# Patient Record
Sex: FEMALE | Race: WHITE | NOT HISPANIC OR LATINO | URBAN - METROPOLITAN AREA
[De-identification: names, ages, dates, MRNs, and addresses within clinical notes are randomized per-mention and may not be internally consistent; named-entity substitution may affect disease eponyms.]

---

## 2017-01-31 ENCOUNTER — OUTPATIENT (OUTPATIENT)
Dept: OUTPATIENT SERVICES | Facility: HOSPITAL | Age: 67
LOS: 1 days | Discharge: HOME | End: 2017-01-31

## 2017-01-31 DIAGNOSIS — C50.919 MALIGNANT NEOPLASM OF UNSPECIFIED SITE OF UNSPECIFIED FEMALE BREAST: ICD-10-CM

## 2017-02-02 ENCOUNTER — APPOINTMENT (OUTPATIENT)
Dept: HEMATOLOGY ONCOLOGY | Facility: CLINIC | Age: 67
End: 2017-02-02

## 2017-02-02 VITALS
BODY MASS INDEX: 24.21 KG/M2 | SYSTOLIC BLOOD PRESSURE: 120 MMHG | TEMPERATURE: 98.1 F | WEIGHT: 145.48 LBS | RESPIRATION RATE: 16 BRPM | HEART RATE: 74 BPM | DIASTOLIC BLOOD PRESSURE: 80 MMHG | OXYGEN SATURATION: 99 %

## 2017-03-17 ENCOUNTER — FORM ENCOUNTER (OUTPATIENT)
Age: 67
End: 2017-03-17

## 2017-03-18 ENCOUNTER — APPOINTMENT (OUTPATIENT)
Dept: MRI IMAGING | Facility: IMAGING CENTER | Age: 67
End: 2017-03-18

## 2017-03-18 ENCOUNTER — OUTPATIENT (OUTPATIENT)
Dept: OUTPATIENT SERVICES | Facility: HOSPITAL | Age: 67
LOS: 1 days | End: 2017-03-18
Payer: COMMERCIAL

## 2017-03-18 DIAGNOSIS — Z00.8 ENCOUNTER FOR OTHER GENERAL EXAMINATION: ICD-10-CM

## 2017-03-18 PROCEDURE — C8908: CPT

## 2017-03-18 PROCEDURE — C8937: CPT

## 2017-03-18 PROCEDURE — 82565 ASSAY OF CREATININE: CPT

## 2017-03-18 PROCEDURE — A9585: CPT

## 2017-04-21 ENCOUNTER — APPOINTMENT (OUTPATIENT)
Dept: GYNECOLOGIC ONCOLOGY | Facility: CLINIC | Age: 67
End: 2017-04-21

## 2017-04-21 VITALS
HEART RATE: 71 BPM | HEIGHT: 65 IN | BODY MASS INDEX: 24.16 KG/M2 | DIASTOLIC BLOOD PRESSURE: 78 MMHG | WEIGHT: 145 LBS | SYSTOLIC BLOOD PRESSURE: 122 MMHG

## 2017-05-01 ENCOUNTER — RX RENEWAL (OUTPATIENT)
Age: 67
End: 2017-05-01

## 2017-05-12 ENCOUNTER — OUTPATIENT (OUTPATIENT)
Dept: OUTPATIENT SERVICES | Facility: HOSPITAL | Age: 67
LOS: 1 days | End: 2017-05-12
Payer: COMMERCIAL

## 2017-05-12 ENCOUNTER — APPOINTMENT (OUTPATIENT)
Dept: ULTRASOUND IMAGING | Facility: IMAGING CENTER | Age: 67
End: 2017-05-12

## 2017-05-12 DIAGNOSIS — Z15.01 GENETIC SUSCEPTIBILITY TO MALIGNANT NEOPLASM OF BREAST: ICD-10-CM

## 2017-05-12 DIAGNOSIS — Z15.02 GENETIC SUSCEPTIBILITY TO MALIGNANT NEOPLASM OF OVARY: ICD-10-CM

## 2017-05-12 PROCEDURE — 76830 TRANSVAGINAL US NON-OB: CPT

## 2017-05-12 PROCEDURE — 76856 US EXAM PELVIC COMPLETE: CPT

## 2017-07-31 ENCOUNTER — OUTPATIENT (OUTPATIENT)
Dept: OUTPATIENT SERVICES | Facility: HOSPITAL | Age: 67
LOS: 1 days | Discharge: HOME | End: 2017-07-31

## 2017-07-31 DIAGNOSIS — C50.919 MALIGNANT NEOPLASM OF UNSPECIFIED SITE OF UNSPECIFIED FEMALE BREAST: ICD-10-CM

## 2017-08-03 ENCOUNTER — RESULT REVIEW (OUTPATIENT)
Age: 67
End: 2017-08-03

## 2017-08-03 ENCOUNTER — APPOINTMENT (OUTPATIENT)
Dept: HEMATOLOGY ONCOLOGY | Facility: CLINIC | Age: 67
End: 2017-08-03
Payer: COMMERCIAL

## 2017-08-03 VITALS
SYSTOLIC BLOOD PRESSURE: 119 MMHG | TEMPERATURE: 98 F | RESPIRATION RATE: 16 BRPM | BODY MASS INDEX: 24.17 KG/M2 | HEART RATE: 70 BPM | DIASTOLIC BLOOD PRESSURE: 77 MMHG | HEIGHT: 65 IN | OXYGEN SATURATION: 98 % | WEIGHT: 145.06 LBS

## 2017-08-03 DIAGNOSIS — Z14.8 GENETIC CARRIER OF OTHER DISEASE: ICD-10-CM

## 2017-08-03 DIAGNOSIS — Z15.02 GENETIC SUSCEPTIBILITY TO MALIGNANT NEOPLASM OF OVARY: ICD-10-CM

## 2017-08-03 DIAGNOSIS — R92.8 OTHER ABNORMAL AND INCONCLUSIVE FINDINGS ON DIAGNOSTIC IMAGING OF BREAST: ICD-10-CM

## 2017-08-03 DIAGNOSIS — F32.9 MAJOR DEPRESSIVE DISORDER, SINGLE EPISODE, UNSPECIFIED: ICD-10-CM

## 2017-08-03 LAB
HCT VFR BLD CALC: 42.6 % — SIGNIFICANT CHANGE UP (ref 34.5–45)
HGB BLD-MCNC: 14.3 G/DL — SIGNIFICANT CHANGE UP (ref 11.5–15.5)
MCHC RBC-ENTMCNC: 32.3 PG — SIGNIFICANT CHANGE UP (ref 27–34)
MCHC RBC-ENTMCNC: 33.7 G/DL — SIGNIFICANT CHANGE UP (ref 32–36)
MCV RBC AUTO: 95.9 FL — SIGNIFICANT CHANGE UP (ref 80–100)
PLATELET # BLD AUTO: 196 K/UL — SIGNIFICANT CHANGE UP (ref 150–400)
RBC # BLD: 4.44 M/UL — SIGNIFICANT CHANGE UP (ref 3.8–5.2)
RBC # FLD: 13.3 % — SIGNIFICANT CHANGE UP (ref 10.3–14.5)
WBC # BLD: 4.2 K/UL — SIGNIFICANT CHANGE UP (ref 3.8–10.5)
WBC # FLD AUTO: 4.2 K/UL — SIGNIFICANT CHANGE UP (ref 3.8–10.5)

## 2017-08-03 PROCEDURE — 99215 OFFICE O/P EST HI 40 MIN: CPT

## 2017-08-03 RX ORDER — PSYLLIUM HUSK 0.4 G
CAPSULE ORAL
Refills: 0 | Status: ACTIVE | COMMUNITY

## 2017-08-04 LAB
25(OH)D3 SERPL-MCNC: 47.2 NG/ML
ALBUMIN SERPL ELPH-MCNC: 4.6 G/DL
ALP BLD-CCNC: 59 U/L
ALT SERPL-CCNC: 14 U/L
ANION GAP SERPL CALC-SCNC: 15 MMOL/L
AST SERPL-CCNC: 18 U/L
BILIRUB SERPL-MCNC: 0.5 MG/DL
BUN SERPL-MCNC: 14 MG/DL
CALCIUM SERPL-MCNC: 9.8 MG/DL
CHLORIDE SERPL-SCNC: 106 MMOL/L
CO2 SERPL-SCNC: 21 MMOL/L
CREAT SERPL-MCNC: 0.97 MG/DL
GLUCOSE SERPL-MCNC: 98 MG/DL
POTASSIUM SERPL-SCNC: 5 MMOL/L
PROT SERPL-MCNC: 7 G/DL
SODIUM SERPL-SCNC: 142 MMOL/L

## 2017-09-12 ENCOUNTER — APPOINTMENT (OUTPATIENT)
Dept: ENDOCRINOLOGY | Facility: CLINIC | Age: 67
End: 2017-09-12
Payer: COMMERCIAL

## 2017-09-12 VITALS
HEIGHT: 64.5 IN | OXYGEN SATURATION: 98 % | DIASTOLIC BLOOD PRESSURE: 80 MMHG | HEART RATE: 68 BPM | WEIGHT: 144 LBS | SYSTOLIC BLOOD PRESSURE: 114 MMHG | BODY MASS INDEX: 24.29 KG/M2

## 2017-09-12 DIAGNOSIS — M85.88 OTHER SPECIFIED DISORDERS OF BONE DENSITY AND STRUCTURE, OTHER SITE: ICD-10-CM

## 2017-09-12 PROCEDURE — 99213 OFFICE O/P EST LOW 20 MIN: CPT | Mod: 25

## 2017-09-12 PROCEDURE — 77080 DXA BONE DENSITY AXIAL: CPT

## 2017-09-13 PROBLEM — M85.88 OSTEOPENIA OF SPINE: Status: ACTIVE | Noted: 2017-09-13

## 2017-09-20 LAB
COLLAGEN NTX UR-SCNC: 24 NM BCE/MM CR
CREAT UR-MCNC: 61 MG/DL

## 2018-02-02 ENCOUNTER — OUTPATIENT (OUTPATIENT)
Dept: OUTPATIENT SERVICES | Facility: HOSPITAL | Age: 68
LOS: 1 days | Discharge: HOME | End: 2018-02-02

## 2018-02-02 DIAGNOSIS — C50.919 MALIGNANT NEOPLASM OF UNSPECIFIED SITE OF UNSPECIFIED FEMALE BREAST: ICD-10-CM

## 2018-02-08 ENCOUNTER — APPOINTMENT (OUTPATIENT)
Dept: HEMATOLOGY ONCOLOGY | Facility: CLINIC | Age: 68
End: 2018-02-08
Payer: COMMERCIAL

## 2018-02-08 ENCOUNTER — RESULT REVIEW (OUTPATIENT)
Age: 68
End: 2018-02-08

## 2018-02-08 VITALS
HEART RATE: 71 BPM | WEIGHT: 145.48 LBS | DIASTOLIC BLOOD PRESSURE: 63 MMHG | BODY MASS INDEX: 24.59 KG/M2 | TEMPERATURE: 99 F | RESPIRATION RATE: 16 BRPM | SYSTOLIC BLOOD PRESSURE: 124 MMHG | OXYGEN SATURATION: 97 %

## 2018-02-08 LAB
HCT VFR BLD CALC: 40.9 % — SIGNIFICANT CHANGE UP (ref 34.5–45)
HGB BLD-MCNC: 13.4 G/DL — SIGNIFICANT CHANGE UP (ref 11.5–15.5)
MCHC RBC-ENTMCNC: 31.3 PG — SIGNIFICANT CHANGE UP (ref 27–34)
MCHC RBC-ENTMCNC: 32.9 G/DL — SIGNIFICANT CHANGE UP (ref 32–36)
MCV RBC AUTO: 95.3 FL — SIGNIFICANT CHANGE UP (ref 80–100)
PLATELET # BLD AUTO: 205 K/UL — SIGNIFICANT CHANGE UP (ref 150–400)
RBC # BLD: 4.29 M/UL — SIGNIFICANT CHANGE UP (ref 3.8–5.2)
RBC # FLD: 11.9 % — SIGNIFICANT CHANGE UP (ref 10.3–14.5)
WBC # BLD: 5.6 K/UL — SIGNIFICANT CHANGE UP (ref 3.8–10.5)
WBC # FLD AUTO: 5.6 K/UL — SIGNIFICANT CHANGE UP (ref 3.8–10.5)

## 2018-02-08 PROCEDURE — 99214 OFFICE O/P EST MOD 30 MIN: CPT

## 2018-02-09 LAB
ALBUMIN SERPL ELPH-MCNC: 4.2 G/DL
ALP BLD-CCNC: 68 U/L
ALT SERPL-CCNC: 24 U/L
ANION GAP SERPL CALC-SCNC: 12 MMOL/L
AST SERPL-CCNC: 24 U/L
BILIRUB SERPL-MCNC: 0.3 MG/DL
BUN SERPL-MCNC: 16 MG/DL
CALCIUM SERPL-MCNC: 8.8 MG/DL
CHLORIDE SERPL-SCNC: 105 MMOL/L
CO2 SERPL-SCNC: 26 MMOL/L
CREAT SERPL-MCNC: 0.83 MG/DL
GLUCOSE SERPL-MCNC: 94 MG/DL
POTASSIUM SERPL-SCNC: 4.4 MMOL/L
PROT SERPL-MCNC: 6.6 G/DL
SODIUM SERPL-SCNC: 143 MMOL/L

## 2018-02-18 ENCOUNTER — RX RENEWAL (OUTPATIENT)
Age: 68
End: 2018-02-18

## 2018-03-16 ENCOUNTER — FORM ENCOUNTER (OUTPATIENT)
Age: 68
End: 2018-03-16

## 2018-03-17 ENCOUNTER — APPOINTMENT (OUTPATIENT)
Dept: MRI IMAGING | Facility: IMAGING CENTER | Age: 68
End: 2018-03-17
Payer: COMMERCIAL

## 2018-03-17 ENCOUNTER — OUTPATIENT (OUTPATIENT)
Dept: OUTPATIENT SERVICES | Facility: HOSPITAL | Age: 68
LOS: 1 days | End: 2018-03-17
Payer: COMMERCIAL

## 2018-03-17 DIAGNOSIS — R92.8 OTHER ABNORMAL AND INCONCLUSIVE FINDINGS ON DIAGNOSTIC IMAGING OF BREAST: ICD-10-CM

## 2018-03-17 DIAGNOSIS — C50.911 MALIGNANT NEOPLASM OF UNSPECIFIED SITE OF RIGHT FEMALE BREAST: ICD-10-CM

## 2018-03-17 DIAGNOSIS — Z15.09 GENETIC SUSCEPTIBILITY TO OTHER MALIGNANT NEOPLASM: ICD-10-CM

## 2018-03-17 PROCEDURE — A9585: CPT

## 2018-03-17 PROCEDURE — 0159T: CPT | Mod: 26

## 2018-03-17 PROCEDURE — C8908: CPT

## 2018-03-17 PROCEDURE — 77059 MRI BREAST BILATERAL: CPT | Mod: 26

## 2018-03-17 PROCEDURE — C8937: CPT

## 2018-04-27 ENCOUNTER — APPOINTMENT (OUTPATIENT)
Dept: GYNECOLOGIC ONCOLOGY | Facility: CLINIC | Age: 68
End: 2018-04-27
Payer: COMMERCIAL

## 2018-04-27 VITALS
DIASTOLIC BLOOD PRESSURE: 77 MMHG | BODY MASS INDEX: 24.79 KG/M2 | SYSTOLIC BLOOD PRESSURE: 127 MMHG | HEIGHT: 64.5 IN | WEIGHT: 147 LBS | HEART RATE: 70 BPM

## 2018-04-27 PROCEDURE — 99213 OFFICE O/P EST LOW 20 MIN: CPT

## 2018-04-28 LAB — HPV HIGH+LOW RISK DNA PNL CVX: NOT DETECTED

## 2018-05-03 LAB — CYTOLOGY CVX/VAG DOC THIN PREP: NORMAL

## 2018-09-06 ENCOUNTER — APPOINTMENT (OUTPATIENT)
Dept: ENDOCRINOLOGY | Facility: CLINIC | Age: 68
End: 2018-09-06
Payer: COMMERCIAL

## 2018-09-06 ENCOUNTER — OUTPATIENT (OUTPATIENT)
Dept: OUTPATIENT SERVICES | Facility: HOSPITAL | Age: 68
LOS: 1 days | Discharge: HOME | End: 2018-09-06

## 2018-09-06 VITALS
HEART RATE: 77 BPM | SYSTOLIC BLOOD PRESSURE: 138 MMHG | DIASTOLIC BLOOD PRESSURE: 72 MMHG | BODY MASS INDEX: 24.62 KG/M2 | WEIGHT: 146 LBS | OXYGEN SATURATION: 98 % | HEIGHT: 64.5 IN

## 2018-09-06 DIAGNOSIS — C50.919 MALIGNANT NEOPLASM OF UNSPECIFIED SITE OF UNSPECIFIED FEMALE BREAST: ICD-10-CM

## 2018-09-06 PROCEDURE — 99213 OFFICE O/P EST LOW 20 MIN: CPT | Mod: 25

## 2018-09-06 PROCEDURE — 77080 DXA BONE DENSITY AXIAL: CPT

## 2018-09-13 ENCOUNTER — APPOINTMENT (OUTPATIENT)
Dept: HEMATOLOGY ONCOLOGY | Facility: CLINIC | Age: 68
End: 2018-09-13
Payer: COMMERCIAL

## 2018-09-13 ENCOUNTER — RESULT REVIEW (OUTPATIENT)
Age: 68
End: 2018-09-13

## 2018-09-13 VITALS
HEART RATE: 65 BPM | DIASTOLIC BLOOD PRESSURE: 79 MMHG | OXYGEN SATURATION: 99 % | SYSTOLIC BLOOD PRESSURE: 135 MMHG | WEIGHT: 147.05 LBS | TEMPERATURE: 98.2 F | BODY MASS INDEX: 24.85 KG/M2 | RESPIRATION RATE: 16 BRPM

## 2018-09-13 LAB
HCT VFR BLD CALC: 43.5 % — SIGNIFICANT CHANGE UP (ref 34.5–45)
HGB BLD-MCNC: 14.3 G/DL — SIGNIFICANT CHANGE UP (ref 11.5–15.5)
MCHC RBC-ENTMCNC: 31.3 PG — SIGNIFICANT CHANGE UP (ref 27–34)
MCHC RBC-ENTMCNC: 32.9 G/DL — SIGNIFICANT CHANGE UP (ref 32–36)
MCV RBC AUTO: 95.1 FL — SIGNIFICANT CHANGE UP (ref 80–100)
PLATELET # BLD AUTO: 216 K/UL — SIGNIFICANT CHANGE UP (ref 150–400)
RBC # BLD: 4.57 M/UL — SIGNIFICANT CHANGE UP (ref 3.8–5.2)
RBC # FLD: 12.1 % — SIGNIFICANT CHANGE UP (ref 10.3–14.5)
WBC # BLD: 6 K/UL — SIGNIFICANT CHANGE UP (ref 3.8–10.5)
WBC # FLD AUTO: 6 K/UL — SIGNIFICANT CHANGE UP (ref 3.8–10.5)

## 2018-09-13 PROCEDURE — 99214 OFFICE O/P EST MOD 30 MIN: CPT

## 2018-09-14 ENCOUNTER — RX RENEWAL (OUTPATIENT)
Age: 68
End: 2018-09-14

## 2018-09-20 LAB
25(OH)D3 SERPL-MCNC: 42.6 NG/ML
ALBUMIN SERPL ELPH-MCNC: 4.5 G/DL
ALP BLD-CCNC: 82 U/L
ALT SERPL-CCNC: 21 U/L
ANION GAP SERPL CALC-SCNC: 15 MMOL/L
AST SERPL-CCNC: 25 U/L
BILIRUB SERPL-MCNC: 0.2 MG/DL
BUN SERPL-MCNC: 15 MG/DL
CALCIUM SERPL-MCNC: 9.4 MG/DL
CHLORIDE SERPL-SCNC: 102 MMOL/L
CO2 SERPL-SCNC: 25 MMOL/L
CREAT SERPL-MCNC: 0.83 MG/DL
GLUCOSE SERPL-MCNC: 102 MG/DL
POTASSIUM SERPL-SCNC: 4 MMOL/L
PROT SERPL-MCNC: 6.9 G/DL
SODIUM SERPL-SCNC: 142 MMOL/L

## 2018-11-16 ENCOUNTER — RX RENEWAL (OUTPATIENT)
Age: 68
End: 2018-11-16

## 2018-11-23 ENCOUNTER — TRANSCRIPTION ENCOUNTER (OUTPATIENT)
Age: 68
End: 2018-11-23

## 2019-02-13 ENCOUNTER — RX RENEWAL (OUTPATIENT)
Age: 69
End: 2019-02-13

## 2019-03-07 ENCOUNTER — OUTPATIENT (OUTPATIENT)
Dept: OUTPATIENT SERVICES | Facility: HOSPITAL | Age: 69
LOS: 1 days | Discharge: HOME | End: 2019-03-07

## 2019-03-07 DIAGNOSIS — C50.919 MALIGNANT NEOPLASM OF UNSPECIFIED SITE OF UNSPECIFIED FEMALE BREAST: ICD-10-CM

## 2019-03-14 ENCOUNTER — RESULT REVIEW (OUTPATIENT)
Age: 69
End: 2019-03-14

## 2019-03-14 ENCOUNTER — APPOINTMENT (OUTPATIENT)
Dept: HEMATOLOGY ONCOLOGY | Facility: CLINIC | Age: 69
End: 2019-03-14
Payer: COMMERCIAL

## 2019-03-14 VITALS
OXYGEN SATURATION: 98 % | SYSTOLIC BLOOD PRESSURE: 129 MMHG | RESPIRATION RATE: 16 BRPM | BODY MASS INDEX: 24.7 KG/M2 | HEART RATE: 65 BPM | WEIGHT: 146.16 LBS | TEMPERATURE: 98.4 F | DIASTOLIC BLOOD PRESSURE: 78 MMHG

## 2019-03-14 LAB
HCT VFR BLD CALC: 42 % — SIGNIFICANT CHANGE UP (ref 34.5–45)
HGB BLD-MCNC: 14.3 G/DL — SIGNIFICANT CHANGE UP (ref 11.5–15.5)
MCHC RBC-ENTMCNC: 32 PG — SIGNIFICANT CHANGE UP (ref 27–34)
MCHC RBC-ENTMCNC: 34 G/DL — SIGNIFICANT CHANGE UP (ref 32–36)
MCV RBC AUTO: 94.1 FL — SIGNIFICANT CHANGE UP (ref 80–100)
PLATELET # BLD AUTO: 204 K/UL — SIGNIFICANT CHANGE UP (ref 150–400)
RBC # BLD: 4.46 M/UL — SIGNIFICANT CHANGE UP (ref 3.8–5.2)
RBC # FLD: 12.1 % — SIGNIFICANT CHANGE UP (ref 10.3–14.5)
WBC # BLD: 5.6 K/UL — SIGNIFICANT CHANGE UP (ref 3.8–10.5)
WBC # FLD AUTO: 5.6 K/UL — SIGNIFICANT CHANGE UP (ref 3.8–10.5)

## 2019-03-14 PROCEDURE — 99214 OFFICE O/P EST MOD 30 MIN: CPT

## 2019-03-14 NOTE — HISTORY OF PRESENT ILLNESS
[Disease: _____________________] : Disease: [unfilled] [T: ___] : T[unfilled] [N: ___] : N[unfilled] [M: ___] : M[unfilled] [AJCC Stage: ____] : AJCC Stage: [unfilled] [Treatment Protocol] : Treatment Protocol [ENT] : ENT [Musculoskeletal] : Musculoskeletal [de-identified] : The patient presented in 2009, at age 58, with the mass she discovered on BSE. \par  \par Ultimately Dr. Ignacia Sidhu performed a right mastectomy and right axillary lymph node dissection followed by placement of a saline expander by Dr. Jozef Saini. There were  2 foci of infiltrating ductal carcinoma 4.5 cm apart from each other measuring 1.4 and 0.7 cm respectively. Both tumors were ER positive, MD positive,  and HER-2/syd negative. 1/8 lymph nodes was positive for metastatic disease. \par  \par Postoperatively the patient received 4 cycles of TC chemotherapy extending from June 22, 2009 through August 24, 2009. In September 2009 she started anastrozole which she continues to take. \par  \par In 2011 the patient underwent genetic testing and was found to have a deleterious mutation of BRCA1 (5385 ins C). \par  \par On October 7, 2011 the patient underwent risk reduction BSO and left simple mastectomy / sentinel node biopsy. Pathology from all  specimens was benign. \par  \par  [de-identified] : Multifocal well differentiated  IDC ER+IA+Vmi0axt- [FreeTextEntry1] : Arimidex start 09/22/2009 [de-identified] : The patient has been 9  years 11  months since breast cancer diagnosis.\par \par The patient completed chemotherapy 9 years 11 months  ago.\par \par The patient has been on anastrozole for 9 years, 6 months no side effects reported.\par \par Last saw  breast surgeon Dr Sidhu 02/2019 for her q yearly CBE, exam stable.Next f/u 02/2020\par \par MRI reconstructed breasts 3/17/2018 stable, BI RADS 2, 1 year follow up recommended.\par \par The patient has not seen her plastic surgeon since her last  5 visits  here.\par \par The patient states she had bilateral nipple tattoos done 06/2018 with "a " who is international known and resides in [place].\par \par Last saw PCP Dr Rodriguez 11/2018 for now resolved Rhinitis. \par \par Last saw endocrinologist Dr Acharya 9/06/2018, had bone density which demonstrated a decrease in all site, the patient states she was advised to re initiate  ibandronate. Next f/u 09/2019.\par \par The  patient states she completed once weekly PT and "cold laser therapy" for occasional pain  in right foot since last 2 visit. \par \par Overall the patient states she feels well since last visit.\par \par No other interval event since last seen.\par \par

## 2019-03-14 NOTE — PHYSICAL EXAM
[Fully active, able to carry on all pre-disease performance without restriction] : Status 0 - Fully active, able to carry on all pre-disease performance without restriction [Normal] : affect appropriate [de-identified] : Bilateral mastectomies with silicone implants, no chest wall mass, no axillary adenopathy [de-identified] : Vitiligo

## 2019-03-14 NOTE — REVIEW OF SYSTEMS
[Anxiety] : anxiety [Negative] : Allergic/Immunologic [FreeTextEntry2] : Energy level WNL, still  running, doing yoga, lifting weight and biking. S/P flu vaccination 09/2017. [FreeTextEntry3] : Last eye exam was 06/2018,with benign findings.. [FreeTextEntry4] : See interval history. [FreeTextEntry7] : Last colonoscopy  02/15/2016, no polyps, revealed diverticulosis.Patient state she was advised to return in 5 years. Appetite is good.Now on weight watches diet. [FreeTextEntry8] : Last GYN exam 04/27/2018, with DR Mariah Harris, benign exam. Scheduled for f/u end of this month. Denies abnormal vaginal/spotting/bleeding. [FreeTextEntry9] : Most recent  BMD 09/08/2018, see interval history. Occasional back pain in am which later goes away after warming up, still feels it's from aging. [de-identified] : Had whole body skin check 06/2018, exam with benign findings. [de-identified] : Insomnia and anxiety controlled with lorazepam.

## 2019-04-01 LAB
ALBUMIN SERPL ELPH-MCNC: 4.4 G/DL
ALP BLD-CCNC: 64 U/L
ALT SERPL-CCNC: 20 U/L
ANION GAP SERPL CALC-SCNC: 12 MMOL/L
AST SERPL-CCNC: 22 U/L
BILIRUB SERPL-MCNC: 0.2 MG/DL
BUN SERPL-MCNC: 16 MG/DL
CALCIUM SERPL-MCNC: 9.5 MG/DL
CHLORIDE SERPL-SCNC: 104 MMOL/L
CO2 SERPL-SCNC: 26 MMOL/L
CREAT SERPL-MCNC: 0.85 MG/DL
GLUCOSE SERPL-MCNC: 93 MG/DL
POTASSIUM SERPL-SCNC: 4.1 MMOL/L
PROT SERPL-MCNC: 7 G/DL
SODIUM SERPL-SCNC: 142 MMOL/L

## 2019-04-30 ENCOUNTER — APPOINTMENT (OUTPATIENT)
Dept: GYNECOLOGIC ONCOLOGY | Facility: CLINIC | Age: 69
End: 2019-04-30
Payer: COMMERCIAL

## 2019-04-30 VITALS
HEIGHT: 64.5 IN | SYSTOLIC BLOOD PRESSURE: 119 MMHG | BODY MASS INDEX: 23.95 KG/M2 | HEART RATE: 66 BPM | WEIGHT: 142 LBS | DIASTOLIC BLOOD PRESSURE: 73 MMHG

## 2019-04-30 PROCEDURE — XXXXX: CPT

## 2019-08-16 ENCOUNTER — APPOINTMENT (OUTPATIENT)
Dept: MRI IMAGING | Facility: IMAGING CENTER | Age: 69
End: 2019-08-16

## 2019-09-23 ENCOUNTER — OUTPATIENT (OUTPATIENT)
Dept: OUTPATIENT SERVICES | Facility: HOSPITAL | Age: 69
LOS: 1 days | Discharge: HOME | End: 2019-09-23

## 2019-09-23 DIAGNOSIS — C50.919 MALIGNANT NEOPLASM OF UNSPECIFIED SITE OF UNSPECIFIED FEMALE BREAST: ICD-10-CM

## 2019-09-24 ENCOUNTER — RESULT REVIEW (OUTPATIENT)
Age: 69
End: 2019-09-24

## 2019-09-24 ENCOUNTER — APPOINTMENT (OUTPATIENT)
Dept: HEMATOLOGY ONCOLOGY | Facility: CLINIC | Age: 69
End: 2019-09-24
Payer: COMMERCIAL

## 2019-09-24 VITALS
BODY MASS INDEX: 23.73 KG/M2 | RESPIRATION RATE: 17 BRPM | HEART RATE: 68 BPM | SYSTOLIC BLOOD PRESSURE: 132 MMHG | WEIGHT: 140.43 LBS | DIASTOLIC BLOOD PRESSURE: 82 MMHG | OXYGEN SATURATION: 98 % | TEMPERATURE: 98.9 F

## 2019-09-24 DIAGNOSIS — E78.00 PURE HYPERCHOLESTEROLEMIA, UNSPECIFIED: ICD-10-CM

## 2019-09-24 LAB
HCT VFR BLD CALC: 46 % — HIGH (ref 34.5–45)
HGB BLD-MCNC: 15.1 G/DL — SIGNIFICANT CHANGE UP (ref 11.5–15.5)
MCHC RBC-ENTMCNC: 32.5 PG — SIGNIFICANT CHANGE UP (ref 27–34)
MCHC RBC-ENTMCNC: 32.7 G/DL — SIGNIFICANT CHANGE UP (ref 32–36)
MCV RBC AUTO: 99.3 FL — SIGNIFICANT CHANGE UP (ref 80–100)
PLATELET # BLD AUTO: 203 K/UL — SIGNIFICANT CHANGE UP (ref 150–400)
RBC # BLD: 4.64 M/UL — SIGNIFICANT CHANGE UP (ref 3.8–5.2)
RBC # FLD: 13.4 % — SIGNIFICANT CHANGE UP (ref 10.3–14.5)
WBC # BLD: 6 K/UL — SIGNIFICANT CHANGE UP (ref 3.8–10.5)
WBC # FLD AUTO: 6 K/UL — SIGNIFICANT CHANGE UP (ref 3.8–10.5)

## 2019-09-24 PROCEDURE — 99215 OFFICE O/P EST HI 40 MIN: CPT

## 2019-09-24 NOTE — ASSESSMENT
[With Patient/Caregiver] : : With Patient/Caregiver [FreeTextEntry2] : We discussed the need for a health care proxy and advance directives on 9/24/2019. We discussed possible appropriate individuals to serve as her proxy.  She was given a health care proxy form to review and she will discuss further with her family.\par Will continue the conversation next visit.\par \par  [AdvancecareDate] : 09/24/2019

## 2019-09-24 NOTE — PHYSICAL EXAM
[de-identified] : Bilateral mastectomies with silicone implants, no chest wall mass, no axillary adenopathy [de-identified] : Vitiligo

## 2019-09-24 NOTE — HISTORY OF PRESENT ILLNESS
[de-identified] : The patient presented in 2009, at age 58, with the mass she discovered on BSE. \par  \par Ultimately Dr. Ignacia Sidhu performed a right mastectomy and right axillary lymph node dissection followed by placement of a saline expander by Dr. Jozef Saini. There were  2 foci of infiltrating ductal carcinoma 4.5 cm apart from each other measuring 1.4 and 0.7 cm respectively. Both tumors were ER positive, MT positive,  and HER-2/syd negative. 1/8 lymph nodes was positive for metastatic disease. \par  \par Postoperatively the patient received 4 cycles of TC chemotherapy extending from June 22, 2009 through August 24, 2009. In September 2009 she started anastrozole which she continues to take. \par  \par In 2011 the patient underwent genetic testing and was found to have a deleterious mutation of BRCA1 (5385 ins C). \par  \par On October 7, 2011 the patient underwent risk reduction BSO and left simple mastectomy / sentinel node biopsy. Pathology from all  specimens was benign. \par  \par  [FreeTextEntry1] : Arimidex start 09/22/2009 - 9/24/2019 [de-identified] : The patient has been 10   years 5  months since breast cancer diagnosis.\par \par The patient completed chemotherapy 10  years 5  months  ago.\par \par The patient has been on anastrozole for 10 years.\par \par Last saw  breast surgeon Dr Sidhu 02/2019 for her q yearly CBE, exam stable.Next f/u 02/2020\par \par MRI reconstructed breasts 3/17/2018 stable, BI RADS 2, 1 year follow up recommended.\par \par Saw Dr Lemus 4/2019, exam stable.\par \par Last saw endocrinologist Dr Acharya 9/06/2018, had bone density which demonstrated a decrease in all site, the patient states she was advised to re initiate  ibandronate. Next f/u 09/2019.\par \par Overall the patient states she feels well since last visit.\par \par No other interval event since last seen.\par \par  [de-identified] : Multifocal well differentiated  IDC ER+RI+Thh2lfl-

## 2019-09-25 LAB
ALBUMIN SERPL ELPH-MCNC: 4.8 G/DL
ALP BLD-CCNC: 65 U/L
ALT SERPL-CCNC: 28 U/L
ANION GAP SERPL CALC-SCNC: 11 MMOL/L
AST SERPL-CCNC: 27 U/L
BILIRUB SERPL-MCNC: 0.2 MG/DL
BUN SERPL-MCNC: 15 MG/DL
CALCIUM SERPL-MCNC: 9.6 MG/DL
CHLORIDE SERPL-SCNC: 104 MMOL/L
CO2 SERPL-SCNC: 27 MMOL/L
CREAT SERPL-MCNC: 0.85 MG/DL
GLUCOSE SERPL-MCNC: 101 MG/DL
POTASSIUM SERPL-SCNC: 4 MMOL/L
PROT SERPL-MCNC: 7.3 G/DL
SODIUM SERPL-SCNC: 142 MMOL/L

## 2019-11-04 ENCOUNTER — APPOINTMENT (OUTPATIENT)
Dept: ENDOCRINOLOGY | Facility: CLINIC | Age: 69
End: 2019-11-04
Payer: COMMERCIAL

## 2019-11-04 VITALS
SYSTOLIC BLOOD PRESSURE: 126 MMHG | DIASTOLIC BLOOD PRESSURE: 70 MMHG | BODY MASS INDEX: 23.78 KG/M2 | HEART RATE: 67 BPM | OXYGEN SATURATION: 99 % | WEIGHT: 141 LBS | HEIGHT: 64.5 IN

## 2019-11-04 DIAGNOSIS — Z79.811 LONG TERM (CURRENT) USE OF AROMATASE INHIBITORS: ICD-10-CM

## 2019-11-04 PROCEDURE — 99213 OFFICE O/P EST LOW 20 MIN: CPT | Mod: 25

## 2019-11-04 PROCEDURE — 77080 DXA BONE DENSITY AXIAL: CPT | Mod: GA

## 2019-11-04 NOTE — PHYSICAL EXAM
[Alert] : alert [No Acute Distress] : no acute distress [Well Nourished] : well nourished [Well Developed] : well developed [Normal Sclera/Conjunctiva] : normal sclera/conjunctiva [No Proptosis] : no proptosis [No LAD] : no lymphadenopathy [Thyroid Not Enlarged] : the thyroid was not enlarged [No Respiratory Distress] : no respiratory distress [Clear to Auscultation] : lungs were clear to auscultation bilaterally [Normal S1, S2] : normal S1 and S2 [Regular Rhythm] : with a regular rhythm [No Edema] : there was no peripheral edema [Normal Bowel Sounds] : normal bowel sounds [Not Tender] : non-tender [Soft] : abdomen soft [No Spinal Tenderness] : no spinal tenderness [Normal Strength/Tone] : muscle strength and tone were normal [Normal Reflexes] : deep tendon reflexes were 2+ and symmetric [No Tremors] : no tremors [Normal Affect] : the affect was normal [Normal Mood] : the mood was normal [Scoliosis] : scoliosis not present

## 2019-11-04 NOTE — HISTORY OF PRESENT ILLNESS
[FreeTextEntry1] : cc: osteopenia\par \par 69 year old woman with osteopenia, on aromatase inhibitor, treated with bisphosphanate tx x ~ 5 years after a rapid drop in bone density, was off tx for one year with decrease in bone density at all sites.  Resumed Boniva ~ 1 year ago.  She reports no recent falls or fractures.  Has been running again and doing yoga, walking.  Consumes 1 serving dairy daily (yogurt) and takes probiotic.  She also takes supplemental calcium with vitamin D.  She was told that she can stop the anastrozole, but she is a little worried about stopping.  Will be continuing for 3 more months\par \par Started weight watchers and has lost weight.\par \par

## 2019-11-04 NOTE — ASSESSMENT
[Bisphosphonate Therapy] : Risks  and benefits of bisphosphonate therapy were  discussed with the patient including gastroesophageal irritation, osteonecrosis of the jaw, and atypical femur fractures, and acute phase reaction [FreeTextEntry1] : 68 year old woman on anastrozole for breast cancer, with osteopenia treated with bisphosphonate tx x 5 years with improved bone density, with decrease after one year "drug holiday" now back on tx for ~one year.  Will be stopping anastrozole in 3 months\par   - DXA performed today, pt with slight increase in bone density.  Recommend continuing the Boniva while on Anastrozole.  Stop Boniva when stopping anastrozole\par  - Repeat DXA one year after stopping (December 2020)\par  - continue calcium 500 mg daily and weight bearing exercise\par \par \par f/u 1 year

## 2019-12-05 ENCOUNTER — APPOINTMENT (OUTPATIENT)
Dept: ULTRASOUND IMAGING | Facility: IMAGING CENTER | Age: 69
End: 2019-12-05
Payer: COMMERCIAL

## 2019-12-05 ENCOUNTER — OUTPATIENT (OUTPATIENT)
Dept: OUTPATIENT SERVICES | Facility: HOSPITAL | Age: 69
LOS: 1 days | End: 2019-12-05
Payer: COMMERCIAL

## 2019-12-05 DIAGNOSIS — Z00.8 ENCOUNTER FOR OTHER GENERAL EXAMINATION: ICD-10-CM

## 2019-12-05 PROCEDURE — 76641 ULTRASOUND BREAST COMPLETE: CPT

## 2019-12-05 PROCEDURE — 76641 ULTRASOUND BREAST COMPLETE: CPT | Mod: 26,50

## 2020-05-07 ENCOUNTER — OUTPATIENT (OUTPATIENT)
Dept: OUTPATIENT SERVICES | Facility: HOSPITAL | Age: 70
LOS: 1 days | Discharge: HOME | End: 2020-05-07

## 2020-05-07 DIAGNOSIS — C50.919 MALIGNANT NEOPLASM OF UNSPECIFIED SITE OF UNSPECIFIED FEMALE BREAST: ICD-10-CM

## 2020-05-12 ENCOUNTER — APPOINTMENT (OUTPATIENT)
Dept: HEMATOLOGY ONCOLOGY | Facility: CLINIC | Age: 70
End: 2020-05-12
Payer: COMMERCIAL

## 2020-05-12 DIAGNOSIS — Z98.82 BREAST IMPLANT STATUS: ICD-10-CM

## 2020-05-12 PROCEDURE — 99213 OFFICE O/P EST LOW 20 MIN: CPT | Mod: 95

## 2020-05-12 NOTE — PHYSICAL EXAM
[Fully active, able to carry on all pre-disease performance without restriction] : Status 0 - Fully active, able to carry on all pre-disease performance without restriction [Normal] : affect appropriate [de-identified] : Weight 138 lbs.

## 2020-05-12 NOTE — ASSESSMENT
[With Patient/Caregiver] : : With Patient/Caregiver [AdvancecareDate] : 09/24/2019 [FreeTextEntry2] : We discussed the need for a health care proxy and advance directives on 9/24/2019. We discussed possible appropriate individuals to serve as her proxy.  She was given a health care proxy form to review and she will discuss further with her family.\par Will continue the conversation next visit.\par \par

## 2020-05-12 NOTE — HISTORY OF PRESENT ILLNESS
[Disease: _____________________] : Disease: [unfilled] [T: ___] : T[unfilled] [N: ___] : N[unfilled] [AJCC Stage: ____] : AJCC Stage: [unfilled] [M: ___] : M[unfilled] [Treatment Protocol] : Treatment Protocol [Home] : at home, [unfilled] , at the time of the visit. [Medical Office: ([facility])___] : at the medical office located in  [Patient] : the patient [Self] : self [FreeTextEntry2] : Di Miller [de-identified] : Multifocal well differentiated  IDC ER+NH+Spr4owi- [FreeTextEntry1] : Arimidex start 09/22/2009 - 1/2020 [de-identified] : The patient presented in 2009, at age 58, with the mass she discovered on BSE. \par  \par Ultimately Dr. Ignacia Sidhu performed a right mastectomy and right axillary lymph node dissection followed by placement of a saline expander by Dr. Jozef Saini. There were  2 foci of infiltrating ductal carcinoma 4.5 cm apart from each other measuring 1.4 and 0.7 cm respectively. Both tumors were ER positive, DC positive,  and HER-2/syd negative. 1/8 lymph nodes was positive for metastatic disease. \par  \par Postoperatively the patient received 4 cycles of TC chemotherapy extending from June 22, 2009 through August 24, 2009. In September 2009 she started anastrozole which she continues to take. \par  \par In 2011 the patient underwent genetic testing and was found to have a deleterious mutation of BRCA1 (5385 ins C). \par  \par On October 7, 2011 the patient underwent risk reduction BSO and left simple mastectomy / sentinel node biopsy. Pathology from all  specimens was benign. \par  \par  [de-identified] : Because of Covid 19 pandemic we agreed to doing a virtual visit  today.\par \par Verbal consent given on 5/12/2020 at 10:50 AM   by Di Miller, patient.\par \par The patient has been 11  years 1  month since breast cancer diagnosis.\par \par The patient completed chemotherapy 10 years 9 months  ago.\par \par The patient completed anastrozole 1/2020 after 19 years 4 months of therapy. Does not feel different off anastrozole, except for increased anxiety. Still feels achy.\par \par Last saw  breast surgeon Dr Sidhu 02/2019 for her q yearly CBE,  plans to schedule follow up this year after COVID 19 pandemic abates.\par \par Patient contacted plastic surgeon, her implants were not recalled.\par \par MRI reconstructed breasts 3/17/2018 stable, BI RADS 2.\par \par US reconstructed breasts 12/5/2019 negative, BIRADS 1\par \par Saw Dr Lemus 4/30/2019, exam stable. Follow up scheduled for 8/7/2020.\par \par Last saw endocrinologist Dr Acharya 11/4/2019. Was told to DC ibandronate when she completed anastrozole. Patient DC'd ibandronate in 1/2020.\par \par Saw PCP Dr Rodriguez earlier this year. Labs done at that time, patient will have reports faxed here.\par \par No other interval event since last seen.\par \par

## 2020-05-12 NOTE — REVIEW OF SYSTEMS
[Anxiety] : anxiety [Negative] : Allergic/Immunologic [FreeTextEntry2] : Energy level WNL, still  running every other day, doing yoga, lifting weight and biking 10 miles every other day.. S/P flu vaccination fall 2019. [FreeTextEntry3] : eyes are tired while using computer. [FreeTextEntry7] : Last colonoscopy  02/15/2016, no polyps, revealed diverticulosis.Patient state she was advised to return in 5 years. [FreeTextEntry8] : Last GYN exam 04/30/2019. See interval history [de-identified] : Had whole body skin check 7/2019, exam with benign findings. [de-identified] : Insomnia and anxiety controlled with lorazepam. [FreeTextEntry9] : Most recent  BMD 11/4/2019. See interval history. Occasional back pain in AM when she wakes up.

## 2020-06-03 ENCOUNTER — APPOINTMENT (OUTPATIENT)
Dept: ORTHOPEDIC SURGERY | Facility: CLINIC | Age: 70
End: 2020-06-03
Payer: COMMERCIAL

## 2020-06-03 VITALS
TEMPERATURE: 98.5 F | WEIGHT: 137 LBS | SYSTOLIC BLOOD PRESSURE: 125 MMHG | HEART RATE: 71 BPM | DIASTOLIC BLOOD PRESSURE: 78 MMHG | HEIGHT: 64.5 IN | BODY MASS INDEX: 23.1 KG/M2

## 2020-06-03 DIAGNOSIS — M54.5 LOW BACK PAIN: ICD-10-CM

## 2020-06-03 PROCEDURE — 72170 X-RAY EXAM OF PELVIS: CPT

## 2020-06-03 PROCEDURE — 72100 X-RAY EXAM L-S SPINE 2/3 VWS: CPT

## 2020-06-03 PROCEDURE — 99204 OFFICE O/P NEW MOD 45 MIN: CPT

## 2020-06-03 NOTE — DISCUSSION/SUMMARY
[de-identified] : Patient was shown her x-rays and gone over the situation detail she was advised to complaints are most likely referable to her spine condition and she will be referred to physical therapy accordingly.  The patient will continue activity level to tolerance she may take meloxicam as needed and will get back to us in 3 to 4 weeks if she is still symptomatic further work-up may be considered.

## 2020-06-03 NOTE — PHYSICAL EXAM
[de-identified] : Physical examination discloses mild muscle spasm of the lower flexion lumbar spine negative straight leg raise.  No acute neurovascular deficits to the lower extremity.  Tenderness to the lateral thigh.  Full and stable nontender range of motion to the hips. [de-identified] : Rays taken of the pelvis and hips are nonspecific.  X-rays of the lumbosacral spine disclose multilevel degenerative disc space narrowing with arthritic scoliosis

## 2020-06-03 NOTE — HISTORY OF PRESENT ILLNESS
[Worsening] : worsening [___ wks] : [unfilled] week(s) ago [2] : a minimum pain level of 2/10 [7] : a maximum pain level of 7/10 [Hip Movement] : worsened by hip movement [Intermit.] : ~He/She~ states the symptoms seem to be intermittent [Walking] : worsened by walking [de-identified] : ascending stairs, sit to stand.  [de-identified] : Pt presents for initial evaluation for pain in her right hip. Pt  states there is no known injury.  Pt states she is a runner and is feeling more uncomfortable. There is no numbness or tingling to the lower extremities. Pt rides a stationary bike apporx  10 miles daily. Pt takes Advil for pain and is helpful.ascending stairs. PT is seeing an endocrinologist and oncologist, (hx breast Ca bilateral mastectomy, brca gene)  and was taking Ibandronate sodium tab, Anastrozole stopped taking in 2/2020

## 2020-07-02 ENCOUNTER — APPOINTMENT (OUTPATIENT)
Dept: ENDOCRINOLOGY | Facility: CLINIC | Age: 70
End: 2020-07-02

## 2020-08-17 ENCOUNTER — APPOINTMENT (OUTPATIENT)
Dept: ENDOCRINOLOGY | Facility: CLINIC | Age: 70
End: 2020-08-17
Payer: COMMERCIAL

## 2020-08-17 VITALS — OXYGEN SATURATION: 98 % | DIASTOLIC BLOOD PRESSURE: 70 MMHG | HEART RATE: 65 BPM | SYSTOLIC BLOOD PRESSURE: 110 MMHG

## 2020-08-17 VITALS — BODY MASS INDEX: 23.44 KG/M2 | HEIGHT: 64.6 IN | TEMPERATURE: 97.1 F | WEIGHT: 139 LBS

## 2020-08-17 DIAGNOSIS — M47.27 OTHER SPONDYLOSIS WITH RADICULOPATHY, LUMBOSACRAL REGION: ICD-10-CM

## 2020-08-17 PROCEDURE — 99213 OFFICE O/P EST LOW 20 MIN: CPT | Mod: 25

## 2020-08-17 PROCEDURE — 77080 DXA BONE DENSITY AXIAL: CPT

## 2020-08-17 PROCEDURE — ZZZZZ: CPT

## 2020-08-17 NOTE — HISTORY OF PRESENT ILLNESS
[FreeTextEntry1] : cc: osteopenia\par \par 70 year old woman with osteopenia, previously on aromatase inhibitor, treated with bisphosphanate tx x ~ 5 years after a rapid drop in bone density, was off tx for one year with decrease in bone density at all sites.  Resumed Boniva for about a year, then stopped aromatase inhibitor tx and boniva was discontinued.  Stopped both in February 2020 .  She reports no recent falls or fractures.  Has been running and doing on-line yoga as well as weights.  Consumes 1 serving dairy daily (yogurt) and takes probiotic.  She also takes supplemental calcium with vitamin D.  \par \par Saw orthopedist for hip pain, was told she has arthritis in her back, and she should keep running.   Had physical therapy, with improvement in pain.\par \par Weight has been stable\par \par

## 2020-08-17 NOTE — ASSESSMENT
[FreeTextEntry1] : 70 year old woman previously on anastrozole for breast cancer, with osteopenia treated with bisphosphonate tx x 5 years with improved bone density, with decrease after one year "drug holiday" then back on tx for ~one year.  Stopped anastrozole and bisphosphonate 6 months ago.\par   - DXA performed today, pt with increased bone density in hip, spine.  Continue off tx\par  - Repeat DXA  in 2 years\par  - continue calcium 500 mg daily and weight bearing exercise\par \par Osteoarthritis - pain improved with physical therapy\par \par f/u 1 year

## 2020-08-17 NOTE — PHYSICAL EXAM
[Healthy Appearance] : healthy appearance [Alert] : alert [Normal Sclera/Conjunctiva] : normal sclera/conjunctiva [No Acute Distress] : no acute distress [No LAD] : no lymphadenopathy [No Proptosis] : no proptosis [Clear to Auscultation] : lungs were clear to auscultation bilaterally [No Respiratory Distress] : no respiratory distress [Thyroid Not Enlarged] : the thyroid was not enlarged [Normal S1, S2] : normal S1 and S2 [Regular Rhythm] : with a regular rhythm [No Edema] : no peripheral edema [Normal Bowel Sounds] : normal bowel sounds [Not Tender] : non-tender [Soft] : abdomen soft [No Spinal Tenderness] : no spinal tenderness [Kyphosis] : no kyphosis present [Normal Reflexes] : deep tendon reflexes were 2+ and symmetric [Normal Strength/Tone] : muscle strength and tone were normal [Normal Affect] : the affect was normal [Normal Mood] : the mood was normal [No Tremors] : no tremors

## 2020-09-15 ENCOUNTER — TRANSCRIPTION ENCOUNTER (OUTPATIENT)
Age: 70
End: 2020-09-15

## 2020-10-03 ENCOUNTER — OUTPATIENT (OUTPATIENT)
Dept: OUTPATIENT SERVICES | Facility: HOSPITAL | Age: 70
LOS: 1 days | End: 2020-10-03
Payer: COMMERCIAL

## 2020-10-03 ENCOUNTER — APPOINTMENT (OUTPATIENT)
Dept: MRI IMAGING | Facility: IMAGING CENTER | Age: 70
End: 2020-10-03
Payer: COMMERCIAL

## 2020-10-03 DIAGNOSIS — Z00.8 ENCOUNTER FOR OTHER GENERAL EXAMINATION: ICD-10-CM

## 2020-10-03 PROCEDURE — A9585: CPT

## 2020-10-03 PROCEDURE — 77049 MRI BREAST C-+ W/CAD BI: CPT | Mod: 26

## 2020-10-03 PROCEDURE — C8908: CPT

## 2020-10-03 PROCEDURE — C8937: CPT

## 2020-10-06 ENCOUNTER — APPOINTMENT (OUTPATIENT)
Dept: GYNECOLOGIC ONCOLOGY | Facility: CLINIC | Age: 70
End: 2020-10-06
Payer: COMMERCIAL

## 2020-10-06 VITALS
HEIGHT: 64.6 IN | BODY MASS INDEX: 23.44 KG/M2 | WEIGHT: 139 LBS | SYSTOLIC BLOOD PRESSURE: 132 MMHG | DIASTOLIC BLOOD PRESSURE: 75 MMHG | HEART RATE: 77 BPM

## 2020-10-06 PROCEDURE — 99213 OFFICE O/P EST LOW 20 MIN: CPT

## 2020-10-08 LAB — HPV HIGH+LOW RISK DNA PNL CVX: NOT DETECTED

## 2020-10-12 LAB — CYTOLOGY CVX/VAG DOC THIN PREP: ABNORMAL

## 2020-10-28 ENCOUNTER — OUTPATIENT (OUTPATIENT)
Dept: OUTPATIENT SERVICES | Facility: HOSPITAL | Age: 70
LOS: 1 days | Discharge: HOME | End: 2020-10-28

## 2020-10-28 DIAGNOSIS — C50.919 MALIGNANT NEOPLASM OF UNSPECIFIED SITE OF UNSPECIFIED FEMALE BREAST: ICD-10-CM

## 2020-11-03 ENCOUNTER — APPOINTMENT (OUTPATIENT)
Dept: HEMATOLOGY ONCOLOGY | Facility: CLINIC | Age: 70
End: 2020-11-03
Payer: COMMERCIAL

## 2020-11-03 PROCEDURE — 99214 OFFICE O/P EST MOD 30 MIN: CPT | Mod: 95

## 2020-11-03 RX ORDER — FLUOROURACIL 50 MG/G
5 CREAM TOPICAL
Qty: 40 | Refills: 0 | Status: ACTIVE | COMMUNITY
Start: 2020-09-03

## 2020-11-03 RX ORDER — MELOXICAM 15 MG/1
15 TABLET ORAL
Qty: 30 | Refills: 1 | Status: DISCONTINUED | COMMUNITY
Start: 2020-06-03 | End: 2020-11-03

## 2020-11-03 NOTE — HISTORY OF PRESENT ILLNESS
[Medical Office: ([facility])___] : at the medical office located in  [Disease: _____________________] : Disease: [unfilled] [T: ___] : T[unfilled] [N: ___] : N[unfilled] [M: ___] : M[unfilled] [AJCC Stage: ____] : AJCC Stage: [unfilled] [Treatment Protocol] : Treatment Protocol [de-identified] : The patient presented in 2009, at age 58, with the mass she discovered on BSE. \par  \par Ultimately Dr. Ignacia Sidhu performed a right mastectomy and right axillary lymph node dissection followed by placement of a saline expander by Dr. Jozef Saini. There were  2 foci of infiltrating ductal carcinoma 4.5 cm apart from each other measuring 1.4 and 0.7 cm respectively. Both tumors were ER positive, MN positive,  and HER-2/syd negative. 1/8 lymph nodes was positive for metastatic disease. \par  \par Postoperatively the patient received 4 cycles of TC chemotherapy extending from June 22, 2009 through August 24, 2009. In September 2009 she started anastrozole which she continues to take. \par  \par In 2011 the patient underwent genetic testing and was found to have a deleterious mutation of BRCA1 (5385 ins C). \par  \par On October 7, 2011 the patient underwent risk reduction BSO and left simple mastectomy / sentinel node biopsy. Pathology from all  specimens was benign. \par  \par  [de-identified] : Multifocal well differentiated  IDC ER+WY+Mzn0xvr- [FreeTextEntry1] : Arimidex start 09/22/2009 - 1/2020 [de-identified] : Because of Covid 19 pandemic we agreed to doing a virtual visit  today. The visit was conducted via the [employer] platform.\par \par Verbal consent given on 11/3/2020 at  1 PM   by Di Miller, patient.\par \par The patient has been 11  years 7  months since breast cancer diagnosis.\par \par The patient completed chemotherapy 11 years 3 months  ago.\par \par The patient completed anastrozole 1/2020 after 10 years 4 months of therapy. \par \par Last saw  breast surgeon Dr Sidhu 9/2020, exam OK.\par \par Patient contacted plastic surgeon, her implants were not recalled.\par \par MRI reconstructed breasts 10/3/2020 stable silicone implants stable, BI RADS 2.\par \par US reconstructed breasts 12/5/2019 negative, BIRADS 1\par \par Saw Dr Lemus 4/30/2019, exam stable. Follow up scheduled for 8/7/2020.\par \par Last saw endocrinologist Dr Acharya 11/4/2019. Was told to DC ibandronate when she completed anastrozole. Patient DC'd ibandronate in 1/2020.\par \par Saw PCP Dr Rodriguez earlier this year. Labs done at that time, patient will have reports faxed here.\par \par No other interval event since last seen.\par \par

## 2020-11-03 NOTE — PHYSICAL EXAM
[Fully active, able to carry on all pre-disease performance without restriction] : Status 0 - Fully active, able to carry on all pre-disease performance without restriction [Normal] : affect appropriate [de-identified] : anxious

## 2020-11-05 ENCOUNTER — RESULT REVIEW (OUTPATIENT)
Age: 70
End: 2020-11-05

## 2020-11-05 ENCOUNTER — APPOINTMENT (OUTPATIENT)
Dept: HEMATOLOGY ONCOLOGY | Facility: CLINIC | Age: 70
End: 2020-11-05
Payer: COMMERCIAL

## 2020-11-05 VITALS
TEMPERATURE: 97.7 F | OXYGEN SATURATION: 98 % | DIASTOLIC BLOOD PRESSURE: 78 MMHG | HEIGHT: 64.61 IN | WEIGHT: 141.1 LBS | SYSTOLIC BLOOD PRESSURE: 128 MMHG | RESPIRATION RATE: 16 BRPM | BODY MASS INDEX: 23.8 KG/M2 | HEART RATE: 68 BPM

## 2020-11-05 LAB
BASOPHILS # BLD AUTO: 0.06 K/UL — SIGNIFICANT CHANGE UP (ref 0–0.2)
BASOPHILS NFR BLD AUTO: 1.1 % — SIGNIFICANT CHANGE UP (ref 0–2)
EOSINOPHIL # BLD AUTO: 0.12 K/UL — SIGNIFICANT CHANGE UP (ref 0–0.5)
EOSINOPHIL NFR BLD AUTO: 2.2 % — SIGNIFICANT CHANGE UP (ref 0–6)
HCT VFR BLD CALC: 42.7 % — SIGNIFICANT CHANGE UP (ref 34.5–45)
HGB BLD-MCNC: 13.7 G/DL — SIGNIFICANT CHANGE UP (ref 11.5–15.5)
IMM GRANULOCYTES NFR BLD AUTO: 0.7 % — SIGNIFICANT CHANGE UP (ref 0–1.5)
LYMPHOCYTES # BLD AUTO: 1.78 K/UL — SIGNIFICANT CHANGE UP (ref 1–3.3)
LYMPHOCYTES # BLD AUTO: 32.3 % — SIGNIFICANT CHANGE UP (ref 13–44)
MCHC RBC-ENTMCNC: 31.3 PG — SIGNIFICANT CHANGE UP (ref 27–34)
MCHC RBC-ENTMCNC: 32.1 G/DL — SIGNIFICANT CHANGE UP (ref 32–36)
MCV RBC AUTO: 97.5 FL — SIGNIFICANT CHANGE UP (ref 80–100)
MONOCYTES # BLD AUTO: 0.43 K/UL — SIGNIFICANT CHANGE UP (ref 0–0.9)
MONOCYTES NFR BLD AUTO: 7.8 % — SIGNIFICANT CHANGE UP (ref 2–14)
NEUTROPHILS # BLD AUTO: 3.08 K/UL — SIGNIFICANT CHANGE UP (ref 1.8–7.4)
NEUTROPHILS NFR BLD AUTO: 55.9 % — SIGNIFICANT CHANGE UP (ref 43–77)
NRBC # BLD: 0 /100 WBCS — SIGNIFICANT CHANGE UP (ref 0–0)
PLATELET # BLD AUTO: 204 K/UL — SIGNIFICANT CHANGE UP (ref 150–400)
RBC # BLD: 4.38 M/UL — SIGNIFICANT CHANGE UP (ref 3.8–5.2)
RBC # FLD: 12.9 % — SIGNIFICANT CHANGE UP (ref 10.3–14.5)
WBC # BLD: 5.51 K/UL — SIGNIFICANT CHANGE UP (ref 3.8–10.5)
WBC # FLD AUTO: 5.51 K/UL — SIGNIFICANT CHANGE UP (ref 3.8–10.5)

## 2020-11-05 PROCEDURE — 99072 ADDL SUPL MATRL&STAF TM PHE: CPT

## 2020-11-05 PROCEDURE — 99213 OFFICE O/P EST LOW 20 MIN: CPT

## 2020-11-05 NOTE — HISTORY OF PRESENT ILLNESS
[Disease: _____________________] : Disease: [unfilled] [T: ___] : T[unfilled] [N: ___] : N[unfilled] [M: ___] : M[unfilled] [AJCC Stage: ____] : AJCC Stage: [unfilled] [Treatment Protocol] : Treatment Protocol [de-identified] : The patient presented in 2009, at age 58, with the mass she discovered on BSE. \par  \par Ultimately Dr. Ignacia Sidhu performed a right mastectomy and right axillary lymph node dissection followed by placement of a saline expander by Dr. Jozef Saini. There were  2 foci of infiltrating ductal carcinoma 4.5 cm apart from each other measuring 1.4 and 0.7 cm respectively. Both tumors were ER positive, MT positive,  and HER-2/syd negative. 1/8 lymph nodes was positive for metastatic disease. \par  \par Postoperatively the patient received 4 cycles of TC chemotherapy extending from June 22, 2009 through August 24, 2009. In September 2009 she started anastrozole which she continues to take. \par  \par In 2011 the patient underwent genetic testing and was found to have a deleterious mutation of BRCA1 (5385 ins C). \par  \par On October 7, 2011 the patient underwent risk reduction BSO and left simple mastectomy / sentinel node biopsy. Pathology from all  specimens was benign. \par  \par  [de-identified] : Multifocal well differentiated  IDC ER+UT+Raf3lkw- [FreeTextEntry1] : Arimidex start 09/22/2009 - 1/2020 [de-identified] : The patient came in for a physical exam today, she has not had one here since 9/24/2019.\par \par No changes in history since telemedicine appointment 2 days ago.\par \par The patient has been 11  years 7  months since breast cancer diagnosis.\par \par The patient completed chemotherapy 11 years 3 months  ago.\par \par The patient completed anastrozole 1/2020 after 10 years 4 months of therapy. \par \par Last saw  breast surgeon Dr Sidhu 9/2020, exam OK.\par \par Patient contacted plastic surgeon, her implants were not recalled.\par \par MRI reconstructed breasts 10/3/2020 stable silicone implants stable, BI RADS 2.\par \par US reconstructed breasts 12/5/2019 negative, BIRADS 1\par \par Saw Dr Lemus 4/30/2019, exam stable. Follow up scheduled for 8/7/2020.\par \par Last saw endocrinologist Dr Acharya 11/4/2019. Was told to DC ibandronate when she completed anastrozole. Patient DC'd ibandronate in 1/2020.\par \par Saw PCP Dr Rodriguez earlier this year. Labs done at that time, patient will have reports faxed here.\par \par No other interval event since last seen.\par \par

## 2020-11-05 NOTE — PHYSICAL EXAM
[Fully active, able to carry on all pre-disease performance without restriction] : Status 0 - Fully active, able to carry on all pre-disease performance without restriction [Normal] : affect appropriate [de-identified] : Bilateral mastectomies with silicone implants, no chest wall mass, no axillary adenopathy [de-identified] : Vitiligo

## 2020-11-05 NOTE — REVIEW OF SYSTEMS
[Anxiety] : anxiety [Negative] : Allergic/Immunologic [FreeTextEntry2] : Energy level WNL, still  running a few timer per week.  S/P flu vaccination 10/2020. Never had pneumonia vaccination. [FreeTextEntry3] : Eyes are tired while using computer. [FreeTextEntry7] : Last colonoscopy  02/15/2016, no polyps, revealed diverticulosis.Patient state she was advised to return in 5 years. [FreeTextEntry8] : Last GYN oncology exam 10/6/2020 with Dr Lemus.  No vaginal bleeding or spotting. [FreeTextEntry9] : Most recent  BMD 8/17/2020. Saw orthopedist for right hip pain. [de-identified] : Had whole body skin check 10/2020. Had biopsy of lesion of leg, ws told she had precancerous lesion, had re-excision with plastic surgeon. [de-identified] : Insomnia and anxiety controlled with lorazepam.

## 2020-11-06 LAB
ALBUMIN SERPL ELPH-MCNC: 4.7 G/DL
ALP BLD-CCNC: 77 U/L
ALT SERPL-CCNC: 27 U/L
ANION GAP SERPL CALC-SCNC: 9 MMOL/L
AST SERPL-CCNC: 31 U/L
BILIRUB SERPL-MCNC: 0.4 MG/DL
BUN SERPL-MCNC: 15 MG/DL
CALCIUM SERPL-MCNC: 9.9 MG/DL
CHLORIDE SERPL-SCNC: 102 MMOL/L
CO2 SERPL-SCNC: 28 MMOL/L
CREAT SERPL-MCNC: 0.81 MG/DL
GLUCOSE SERPL-MCNC: 92 MG/DL
POTASSIUM SERPL-SCNC: 4.1 MMOL/L
PROT SERPL-MCNC: 6.9 G/DL
SODIUM SERPL-SCNC: 140 MMOL/L

## 2021-01-08 ENCOUNTER — TRANSCRIPTION ENCOUNTER (OUTPATIENT)
Age: 71
End: 2021-01-08

## 2021-02-05 NOTE — END OF VISIT
Hospitalist Progress Note    Patient: Leda Olszewski MRN: [id number]  CSN: [id number]    YOB: 1978  Age: 36 y.o. Sex: female    DOA: 2/2/2021 LOS:  LOS: 3 days          Chief Complaint:    diarrhea      Assessment/Plan     acute edematous interstitial pancreatitis-better  Lipase normal - expected for h/o chronic pancreatitis     Appreciate GI consult  NO etoh reviewed with patient, this has been discussed in past with her on prior visit, but she has still drank     Diarrhea-salmonella positive on stool test-start cipro     Transaminitis - improved     UTI -  Recent treatment with macrobid      Alcohol use -counselled absolute cessation     C difficile toxin NEGATIVE    Advance diet  D/c tomorrow on PO abx if doing ok      Disposition :  Patient Active Problem List   Diagnosis Code    Status post gastric bypass for obesity Z98.84    Hypertension I10    Anemia D64.9    Anxiety F41.9    GERD (gastroesophageal reflux disease) K21.9    Smoker F17.200    Portal vein thrombosis I81    Gastritis and duodenitis K29.90    Acute on chronic pancreatitis ([facility] [address] K85.90, K86.1    Pancreatitis K85.90    Nausea & vomiting R11.2    Elevated liver enzymes R74.8    Pancreatitis, alcoholic, acute O77.01    Hematuria R31.9    UTI (urinary tract infection) N39.0    Abdominal pain R10.9    Transaminitis R74.01    Injury of right elbow S59.901A    Salmonella A02.9       Subjective:  Loose stool still, not watery  No inc abd pain, in fact pain is better  No N/V      Review of systems:    Constitutional: denies fevers, chills  Respiratory: denies SOB  Cardiovascular: denies chest pain  Gastrointestinal: denies nausea, vomiting      Vital signs/Intake and Output:  Visit Vitals  BP (!) 147/97   Pulse 80   Temp 98 °F (36.7 °C)   Resp 18   Ht 5' 5\" (1.651 m)   Wt 101.5 kg (223 lb 11.2 oz)   SpO2 97%   Breastfeeding No   BMI 37.23 kg/m²     Current Shift:  No intake/output data recorded.   Last three shifts: 02/03 1901 - 02/05 0700  In: 8034.6 [P.O.:2300; I.V.:5734.6]  Out: -     Exam:    General: Well developed, alert, NAD, OX3  CVS:Regular rate and rhythm, no M/R/G, S1/S2 heard, no thrill  Lungs:Clear to auscultation bilaterally, no wheezes, rhonchi, or rales  Abdomen: Soft, Nontender, No distention, Normal Bowel sounds, No hepatomegaly  Extremities: No C/C/E, pulses palpable 2+  Neuro:grossly normal , follows commands  Psych:appropriate                Labs: Results:       Chemistry Recent Labs     02/05/21 0305 02/04/21  0115 02/03/21  0845   GLU 88 77 128*    142 136   K 3.6 3.7 3.6    112* 105   CO2 22 24 23   BUN 2* 2* 3*   CREA 0.55* 0.53* 0.68   CA 7.2* 7.8* 7.3*   AGAP 9 6 8   BUCR 4* 4* 4*   * 176* 226*   TP 5.5* 5.1* 5.6*   ALB 2.3* 2.2* 2.5*   GLOB 3.2 2.9 3.1   AGRAT 0.7* 0.8 0.8      CBC w/Diff Recent Labs     02/05/21 0305 02/04/21  0115 02/03/21  0845   WBC 6.0 3.9* 3.0*   RBC 2.49* 2.51* 2.65*   HGB 9.6* 9.7* 10.0*   HCT 28.1* 28.6* 29.9*    238 232   GRANS 60 55 69   LYMPH 26 32 22   EOS 4 4 1      Cardiac Enzymes Recent Labs     02/02/21  1915   CPK 79   CKND1 CALCULATION NOT PERFORMED WHEN RESULT IS BELOW LINEAR LIMIT      Coagulation Recent Labs     02/02/21  1817   PTP 14.1   INR 1.1   APTT 27.9       Lipid Panel Lab Results   Component Value Date/Time    Cholesterol, total 139 02/03/2021 08:45 AM    HDL Cholesterol 60 02/03/2021 08:45 AM    LDL, calculated 54.2 02/03/2021 08:45 AM    VLDL, calculated 24.8 02/03/2021 08:45 AM    Triglyceride 124 02/03/2021 08:45 AM    CHOL/HDL Ratio 2.3 02/03/2021 08:45 AM      BNP No results for input(s): BNPP in the last 72 hours.    Liver Enzymes Recent Labs     02/05/21  0305   TP 5.5*   ALB 2.3*   *      Thyroid Studies Lab Results   Component Value Date/Time    TSH 2.18 06/29/2020 12:35 AM        Procedures/imaging: see electronic medical records for all procedures/Xrays and details which were not copied into this note but were reviewed prior to creation of Demi Billy MD [Time Spent: ___ minutes] : I have spent [unfilled] minutes of time on the encounter. [>50% of the face to face encounter time was spent on counseling and/or coordination of care for ___] : Greater than 50% of the face to face encounter time was spent on counseling and/or coordination of care for [unfilled]

## 2021-02-25 ENCOUNTER — TRANSCRIPTION ENCOUNTER (OUTPATIENT)
Age: 71
End: 2021-02-25

## 2021-04-07 ENCOUNTER — OUTPATIENT (OUTPATIENT)
Dept: OUTPATIENT SERVICES | Facility: HOSPITAL | Age: 71
LOS: 1 days | Discharge: HOME | End: 2021-04-07

## 2021-04-07 DIAGNOSIS — C50.919 MALIGNANT NEOPLASM OF UNSPECIFIED SITE OF UNSPECIFIED FEMALE BREAST: ICD-10-CM

## 2021-04-16 ENCOUNTER — RESULT REVIEW (OUTPATIENT)
Age: 71
End: 2021-04-16

## 2021-04-16 ENCOUNTER — APPOINTMENT (OUTPATIENT)
Dept: HEMATOLOGY ONCOLOGY | Facility: CLINIC | Age: 71
End: 2021-04-16
Payer: COMMERCIAL

## 2021-04-16 VITALS
HEIGHT: 64.72 IN | WEIGHT: 145.06 LBS | RESPIRATION RATE: 17 BRPM | DIASTOLIC BLOOD PRESSURE: 81 MMHG | TEMPERATURE: 96.8 F | OXYGEN SATURATION: 99 % | SYSTOLIC BLOOD PRESSURE: 125 MMHG | BODY MASS INDEX: 24.46 KG/M2 | HEART RATE: 68 BPM

## 2021-04-16 LAB
BASOPHILS # BLD AUTO: 0.06 K/UL — SIGNIFICANT CHANGE UP (ref 0–0.2)
BASOPHILS NFR BLD AUTO: 1 % — SIGNIFICANT CHANGE UP (ref 0–2)
EOSINOPHIL # BLD AUTO: 0.25 K/UL — SIGNIFICANT CHANGE UP (ref 0–0.5)
EOSINOPHIL NFR BLD AUTO: 4.1 % — SIGNIFICANT CHANGE UP (ref 0–6)
HCT VFR BLD CALC: 42.3 % — SIGNIFICANT CHANGE UP (ref 34.5–45)
HGB BLD-MCNC: 14 G/DL — SIGNIFICANT CHANGE UP (ref 11.5–15.5)
IMM GRANULOCYTES NFR BLD AUTO: 0.2 % — SIGNIFICANT CHANGE UP (ref 0–1.5)
LYMPHOCYTES # BLD AUTO: 1.92 K/UL — SIGNIFICANT CHANGE UP (ref 1–3.3)
LYMPHOCYTES # BLD AUTO: 31.5 % — SIGNIFICANT CHANGE UP (ref 13–44)
MCHC RBC-ENTMCNC: 31.9 PG — SIGNIFICANT CHANGE UP (ref 27–34)
MCHC RBC-ENTMCNC: 33.1 G/DL — SIGNIFICANT CHANGE UP (ref 32–36)
MCV RBC AUTO: 96.4 FL — SIGNIFICANT CHANGE UP (ref 80–100)
MONOCYTES # BLD AUTO: 0.49 K/UL — SIGNIFICANT CHANGE UP (ref 0–0.9)
MONOCYTES NFR BLD AUTO: 8 % — SIGNIFICANT CHANGE UP (ref 2–14)
NEUTROPHILS # BLD AUTO: 3.37 K/UL — SIGNIFICANT CHANGE UP (ref 1.8–7.4)
NEUTROPHILS NFR BLD AUTO: 55.2 % — SIGNIFICANT CHANGE UP (ref 43–77)
NRBC # BLD: 0 /100 WBCS — SIGNIFICANT CHANGE UP (ref 0–0)
PLATELET # BLD AUTO: 217 K/UL — SIGNIFICANT CHANGE UP (ref 150–400)
RBC # BLD: 4.39 M/UL — SIGNIFICANT CHANGE UP (ref 3.8–5.2)
RBC # FLD: 13.3 % — SIGNIFICANT CHANGE UP (ref 10.3–14.5)
WBC # BLD: 6.1 K/UL — SIGNIFICANT CHANGE UP (ref 3.8–10.5)
WBC # FLD AUTO: 6.1 K/UL — SIGNIFICANT CHANGE UP (ref 3.8–10.5)

## 2021-04-16 PROCEDURE — 99072 ADDL SUPL MATRL&STAF TM PHE: CPT

## 2021-04-16 PROCEDURE — 99213 OFFICE O/P EST LOW 20 MIN: CPT

## 2021-04-16 NOTE — ASSESSMENT
[AdvancecareDate] : 09/24/2019 [FreeTextEntry2] : We discussed the need for a health care proxy and advance directives on 9/24/2019. We discussed possible appropriate individuals to serve as her proxy.  She was given a health care proxy form to review and she will discuss further with her family.\par Will continue the conversation next visit.\par \par

## 2021-04-16 NOTE — PHYSICAL EXAM
[Fully active, able to carry on all pre-disease performance without restriction] : Status 0 - Fully active, able to carry on all pre-disease performance without restriction [Normal] : affect appropriate [de-identified] : Bilateral mastectomies with silicone implants, no chest wall mass, no axillary adenopathy [de-identified] : Vitiligo

## 2021-04-16 NOTE — HISTORY OF PRESENT ILLNESS
[Disease: _____________________] : Disease: [unfilled] [T: ___] : T[unfilled] [N: ___] : N[unfilled] [M: ___] : M[unfilled] [AJCC Stage: ____] : AJCC Stage: [unfilled] [Treatment Protocol] : Treatment Protocol [de-identified] : The patient presented in 2009, at age 58, with the mass she discovered on BSE.  Ultimately Dr. Ignacia Sidhu performed a right mastectomy and right axillary lymph node dissection followed by placement of a saline expander by Dr. Jozef Saini. There were  2 foci of infiltrating ductal carcinoma 4.5 cm apart from each other measuring 1.4 and 0.7 cm respectively. Both tumors were ER positive, NM positive,  and HER-2/syd negative. 1/8 lymph nodes was positive for metastatic disease.  Postoperatively the patient received 4 cycles of TC chemotherapy extending from June 22, 2009 through August 24, 2009. In September 2009 she started anastrozole which she continues to take. \par  \par In 2011 the patient underwent genetic testing and was found to have a deleterious mutation of BRCA1 (5385 ins C).  On October 7, 2011 the patient underwent risk reduction BSO and left simple mastectomy / sentinel node biopsy. Pathology from all  specimens was benign.  [de-identified] : Multifocal well differentiated  IDC, ER+MI+Hrg0hht- [FreeTextEntry1] : Arimidex start 09/22/2009 - 1/2020  [de-identified] : Pt reports doing well -Works virtually teaching for [employer] \par Last saw  breast surgeon Dr Sidhu 9/2020, exam OK.\par MRI reconstructed breasts 10/3/2020 stable silicone implants stable, BI RADS 2.  No implant recall. \par Saw Dr Lemus 10/2020, exam stable. Follow up scheduled for 1 year \par She has orthopedic evaluation and found to have right hip arthritis and pinched nerve \par Sees a chiropractor regularly and feels improvement in discomfort with therapy and Advil \par Due for PCP and Colonoscopy this year \par Up to date with GYN and Dr. Ignacia Sidhu \par MRI/US/S breast  Summer and Fall 2021.\par Has an appt Dr. Ignacia Sidhu June 2021\par Breast MRI -2020-  No changes.     Recent bilateral nipple tattooing.   \par Last saw  breast surgeon Dr Sidhu 9/2020, exam OK.\par \par Went to Dermatologist then  Plastic surgeon  March 2020*-  Right thigh bx  negative \par \par No GI distension, bloat,early satiety.  No  GI - Colonoscopy every 5 years ago \par \par No inappropriate vaginal bleeding /discharge.   Continues with Dr. TASHIA Lemus \par \par No back pain or referred pain \par \par No hematuria bruising bleeding, falls or trauma. \par \par No other interval events. \par \par Completed COVID vaccine series. \par \par MRI reconstructed breasts 10/3/2020 stable silicone implants stable, BI RADS 2.  No implant recall.   No palpable/subjective breast/chest wall  or axillary complaints. \par \par Saw Dr Lemus 10/2020, exam stable. Follow up scheduled for 1 year \par \par No other interval event since last seen.\par \par

## 2021-04-16 NOTE — REVIEW OF SYSTEMS
[Anxiety] : anxiety [Negative] : Allergic/Immunologic [FreeTextEntry2] : Energy level WNL, still  running a few timer per week.  S/P flu vaccination 10/2020. Never had pneumonia vaccination. [FreeTextEntry3] : Eyes are tired while using computer. [FreeTextEntry7] : Last colonoscopy  02/15/2016,  (no polyps, revealed diverticulosis.Patient state she was advised to return in 5 years. 2021  [FreeTextEntry8] : Last GYN oncology exam 10/6/2020 with Dr Lemus.  No vaginal bleeding or spotting. [FreeTextEntry9] : Most recent  BMD 8/17/2020. Saw orthopedist for right hip pain.  Hold on bisphosphonate a tthis time  [de-identified] : Had whole body skin check 10/2020. Had biopsy of lesion of leg, ws told she had precancerous lesion, had re-excision with plastic surgeon. [de-identified] : Insomnia and anxiety controlled with lorazepam.

## 2021-04-21 ENCOUNTER — NON-APPOINTMENT (OUTPATIENT)
Age: 71
End: 2021-04-21

## 2021-04-21 LAB
ALBUMIN SERPL ELPH-MCNC: 4.7 G/DL
ALP BLD-CCNC: 72 U/L
ALT SERPL-CCNC: 23 U/L
ANION GAP SERPL CALC-SCNC: 11 MMOL/L
AST SERPL-CCNC: 26 U/L
BILIRUB SERPL-MCNC: 0.3 MG/DL
BUN SERPL-MCNC: 19 MG/DL
CALCIUM SERPL-MCNC: 9.5 MG/DL
CHLORIDE SERPL-SCNC: 104 MMOL/L
CO2 SERPL-SCNC: 25 MMOL/L
CREAT SERPL-MCNC: 0.81 MG/DL
GLUCOSE SERPL-MCNC: 89 MG/DL
POTASSIUM SERPL-SCNC: 4.1 MMOL/L
PROT SERPL-MCNC: 6.9 G/DL
SODIUM SERPL-SCNC: 140 MMOL/L

## 2021-06-01 ENCOUNTER — APPOINTMENT (OUTPATIENT)
Dept: ORTHOPEDIC SURGERY | Facility: CLINIC | Age: 71
End: 2021-06-01

## 2021-07-21 ENCOUNTER — NON-APPOINTMENT (OUTPATIENT)
Age: 71
End: 2021-07-21

## 2021-07-30 ENCOUNTER — RESULT REVIEW (OUTPATIENT)
Age: 71
End: 2021-07-30

## 2021-08-05 ENCOUNTER — OUTPATIENT (OUTPATIENT)
Dept: OUTPATIENT SERVICES | Facility: HOSPITAL | Age: 71
LOS: 1 days | Discharge: HOME | End: 2021-08-05

## 2021-08-05 DIAGNOSIS — C50.919 MALIGNANT NEOPLASM OF UNSPECIFIED SITE OF UNSPECIFIED FEMALE BREAST: ICD-10-CM

## 2021-08-06 ENCOUNTER — APPOINTMENT (OUTPATIENT)
Dept: HEMATOLOGY ONCOLOGY | Facility: CLINIC | Age: 71
End: 2021-08-06
Payer: COMMERCIAL

## 2021-08-06 VITALS
DIASTOLIC BLOOD PRESSURE: 75 MMHG | SYSTOLIC BLOOD PRESSURE: 115 MMHG | HEIGHT: 66.73 IN | TEMPERATURE: 97.1 F | BODY MASS INDEX: 22.47 KG/M2 | WEIGHT: 141.51 LBS | RESPIRATION RATE: 16 BRPM | OXYGEN SATURATION: 98 % | HEART RATE: 73 BPM

## 2021-08-06 DIAGNOSIS — R42 DIZZINESS AND GIDDINESS: ICD-10-CM

## 2021-08-06 PROCEDURE — 99214 OFFICE O/P EST MOD 30 MIN: CPT

## 2021-08-12 PROBLEM — R42 LIGHTHEADEDNESS: Status: ACTIVE | Noted: 2021-08-12

## 2021-08-12 NOTE — HISTORY OF PRESENT ILLNESS
[Disease: _____________________] : Disease: [unfilled] [T: ___] : T[unfilled] [N: ___] : N[unfilled] [M: ___] : M[unfilled] [AJCC Stage: ____] : AJCC Stage: [unfilled] [Treatment Protocol] : Treatment Protocol [de-identified] : The patient presented in 2009, at age 58, with the mass she discovered on BSE.\par \par Ultimately Dr. Ignacia Sidhu performed a right mastectomy and right axillary lymph node dissection followed by placement of a saline expander by Dr. Jozef Saini. There were  2 foci of infiltrating ductal carcinoma 4.5 cm apart from each other measuring 1.4 and 0.7 cm respectively. Both tumors were ER positive, NE positive,  and HER-2/syd negative. 1/8 lymph nodes was positive for metastatic disease.\par \par Postoperatively the patient received 4 cycles of TC chemotherapy extending from June 22, 2009 through August 24, 2009. \par \par In September 2009 she started anastrozole completed in 1/2020.\par \par In 2011 the patient underwent genetic testing and was found to have a deleterious mutation of BRCA1 (5385 ins C). \par \par On October 7, 2011 the patient underwent risk reduction BSO and left simple mastectomy / sentinel node biopsy. Pathology from all  specimens was benign. [de-identified] : Multifocal well differentiated  IDC ER+MI+Jbo8tez- [FreeTextEntry1] : Arimidex start 09/22/2009 - 1/2020 [de-identified] : \par On active surveillance.\par Saw Kaylynn 4/16/21 in the survivorship program.\par Last saw breast surgeon Dr Sidhu 6/7/21, exam OK.\par Patient contacted plastic surgeon, her implants were not recalled.\par MRI reconstructed breasts 10/3/2020 stable silicone implants stable, BI RADS 2.\par US reconstructed breasts 12/5/2019 negative, BIRADS 1\par \par HEALTH MAINTENANCE\par PCP Dr. Rodriguez saw 7/2/21\par OPHTHO b/l cataracts Dr. Cuevas 7/6/21\par Last GYN oncology exam 10/6/2020 with Dr Lemus.  No vaginal bleeding or spotting.\par GI Dr. Girish Holguin colonoscopy 7/30/21 - 3mm sessile polyp, diverticulosis; CT A/P tiny 2mm calcification in the body of pancreas, may be related to minimal chronic hepatitis, no ductal dilatation or inflammatory changes, renal calculus, no acute abdominal findings.\par Last saw endocrinologist Dr Acharya 11/4/2019. Was told to DC ibandronate when she completed anastrozole. Patient DC'd ibandronate in 1/2020.\par h/o mechanical falls, runner; recently feeling unsteady 1.5mos, feels she has to be cautious when walking to avoid falls, no dizziness, headaches, blurry vision, no syncope; saw PCP/ophtho/ ENT Dr. 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

## 2021-08-12 NOTE — REVIEW OF SYSTEMS
[Anxiety] : anxiety [Negative] : Allergic/Immunologic [FreeTextEntry8] : Last GYN oncology exam 10/6/2020 with Dr Lemus.  No vaginal bleeding or spotting. [FreeTextEntry9] : Most recent  BMD 8/17/2020. Saw orthopedist for right hip pain. [de-identified] : Had whole body skin check 10/2020. Had biopsy of lesion of leg, ws told she had precancerous lesion, had re-excision with plastic surgeon. [de-identified] : Insomnia and anxiety controlled with lorazepam.

## 2021-08-12 NOTE — ASSESSMENT
[FreeTextEntry1] : Multifocal well differentiated infiltrating ductal carcinoma, \par     [id number], ER+ AK+ Her2 syd -\par      Stage IB (AJCC 8 edition)\par     Status post mastectomy/ALND.\par     Status post TC x 4.\par     On anastrozole 10 years , off x 10 months\par     Clinically VIVIANE\par     recent labs reviewed\par Deleterious mutation BRCA1\par     s/p bilateral mastectomies after diagnosis of right breast cancer\par     s/p risk reduction BSO\par     also bieng followed by Dr Lemus in GYN oncology. \par      Dr. Ignacia Sidhu -Breast Surgery [FreeTextEntry2] : \par

## 2021-08-12 NOTE — PHYSICAL EXAM
[Fully active, able to carry on all pre-disease performance without restriction] : Status 0 - Fully active, able to carry on all pre-disease performance without restriction [Normal] : affect appropriate [de-identified] : Vitiligo [de-identified] : Bilateral mastectomies with silicone implants, no chest wall mass, no axillary adenopathy

## 2021-09-01 ENCOUNTER — APPOINTMENT (OUTPATIENT)
Dept: ULTRASOUND IMAGING | Facility: IMAGING CENTER | Age: 71
End: 2021-09-01

## 2021-09-02 ENCOUNTER — APPOINTMENT (OUTPATIENT)
Dept: ENDOCRINOLOGY | Facility: CLINIC | Age: 71
End: 2021-09-02
Payer: COMMERCIAL

## 2021-09-02 ENCOUNTER — APPOINTMENT (OUTPATIENT)
Dept: ENDOCRINOLOGY | Facility: CLINIC | Age: 71
End: 2021-09-02

## 2021-09-02 VITALS
DIASTOLIC BLOOD PRESSURE: 80 MMHG | OXYGEN SATURATION: 98 % | WEIGHT: 140 LBS | TEMPERATURE: 98.4 F | BODY MASS INDEX: 22.5 KG/M2 | SYSTOLIC BLOOD PRESSURE: 122 MMHG | HEART RATE: 82 BPM | HEIGHT: 66 IN

## 2021-09-02 DIAGNOSIS — M81.0 AGE-RELATED OSTEOPOROSIS W/OUT CURRENT PATHOLOGICAL FRACTURE: ICD-10-CM

## 2021-09-02 PROCEDURE — 99214 OFFICE O/P EST MOD 30 MIN: CPT

## 2021-09-02 NOTE — HISTORY OF PRESENT ILLNESS
[FreeTextEntry1] : cc: osteopenia\par \par 71 year old woman with osteopenia, previously on aromatase inhibitor, treated with bisphosphanate tx x ~ 5 years after a rapid drop in bone density, was off tx for one year with decrease in bone density at all sites.  Resumed Boniva for about a year, then stopped aromatase inhibitor tx and boniva was discontinued.  Stopped both in February 2020 .  She reports no recent falls or fractures.  Has been running and doing on-line yoga as well as weights.  Consumes 1 serving dairy daily (yogurt) and takes probiotic.  She also takes supplemental calcium with vitamin D.  \par \par Starting in June felt a little light-headed.  Also fell twice.  No fractures.  Saw internist who told her she has vertigo. Also saw ENT.   And had MRI brain, that was normal. Also saw Heme Onc.  Scheduled for breast sono.\par \par Weight has been stable\par \par

## 2021-09-02 NOTE — PHYSICAL EXAM
[Alert] : alert [Healthy Appearance] : healthy appearance [No Acute Distress] : no acute distress [Normal Sclera/Conjunctiva] : normal sclera/conjunctiva [No Proptosis] : no proptosis [No LAD] : no lymphadenopathy [Thyroid Not Enlarged] : the thyroid was not enlarged [No Respiratory Distress] : no respiratory distress [Clear to Auscultation] : lungs were clear to auscultation bilaterally [Normal S1, S2] : normal S1 and S2 [Regular Rhythm] : with a regular rhythm [Normal Appearance] : normal in appearance [Gynecomastia] : gynecomastia present [Not Tender] : non-tender [No HSM] : no hepato-splenomegaly [No Spinal Tenderness] : no spinal tenderness [No Involuntary Movements] : no involuntary movements were seen [Normal Strength/Tone] : muscle strength and tone were normal [Normal Reflexes] : deep tendon reflexes were 2+ and symmetric [No Tremors] : no tremors [Normal Affect] : the affect was normal [Normal Mood] : the mood was normal [Kyphosis] : no kyphosis present

## 2021-09-03 ENCOUNTER — RESULT REVIEW (OUTPATIENT)
Age: 71
End: 2021-09-03

## 2021-09-03 ENCOUNTER — APPOINTMENT (OUTPATIENT)
Dept: ULTRASOUND IMAGING | Facility: IMAGING CENTER | Age: 71
End: 2021-09-03
Payer: COMMERCIAL

## 2021-09-03 ENCOUNTER — OUTPATIENT (OUTPATIENT)
Dept: OUTPATIENT SERVICES | Facility: HOSPITAL | Age: 71
LOS: 1 days | End: 2021-09-03
Payer: COMMERCIAL

## 2021-09-03 DIAGNOSIS — C50.911 MALIGNANT NEOPLASM OF UNSPECIFIED SITE OF RIGHT FEMALE BREAST: ICD-10-CM

## 2021-09-03 DIAGNOSIS — Z00.8 ENCOUNTER FOR OTHER GENERAL EXAMINATION: ICD-10-CM

## 2021-09-03 LAB
ALBUMIN SERPL ELPH-MCNC: 4.6 G/DL
ALP BLD-CCNC: 79 U/L
ALT SERPL-CCNC: 20 U/L
ANION GAP SERPL CALC-SCNC: 14 MMOL/L
AST SERPL-CCNC: 22 U/L
BILIRUB SERPL-MCNC: 0.4 MG/DL
BUN SERPL-MCNC: 15 MG/DL
CALCIUM SERPL-MCNC: 10.2 MG/DL
CHLORIDE SERPL-SCNC: 102 MMOL/L
CO2 SERPL-SCNC: 25 MMOL/L
CREAT SERPL-MCNC: 0.83 MG/DL
GLUCOSE SERPL-MCNC: 85 MG/DL
POTASSIUM SERPL-SCNC: 4.2 MMOL/L
PROT SERPL-MCNC: 7.1 G/DL
SODIUM SERPL-SCNC: 141 MMOL/L
TSH SERPL-ACNC: 2.8 UIU/ML

## 2021-09-03 PROCEDURE — 76641 ULTRASOUND BREAST COMPLETE: CPT | Mod: 26,50

## 2021-09-03 PROCEDURE — 76641 ULTRASOUND BREAST COMPLETE: CPT

## 2021-09-28 ENCOUNTER — APPOINTMENT (OUTPATIENT)
Dept: ULTRASOUND IMAGING | Facility: IMAGING CENTER | Age: 71
End: 2021-09-28
Payer: COMMERCIAL

## 2021-09-28 ENCOUNTER — RESULT REVIEW (OUTPATIENT)
Age: 71
End: 2021-09-28

## 2021-09-28 ENCOUNTER — OUTPATIENT (OUTPATIENT)
Dept: OUTPATIENT SERVICES | Facility: HOSPITAL | Age: 71
LOS: 1 days | End: 2021-09-28
Payer: COMMERCIAL

## 2021-09-28 DIAGNOSIS — R92.8 OTHER ABNORMAL AND INCONCLUSIVE FINDINGS ON DIAGNOSTIC IMAGING OF BREAST: ICD-10-CM

## 2021-09-28 PROCEDURE — 77065 DX MAMMO INCL CAD UNI: CPT | Mod: 26,LT

## 2021-09-28 PROCEDURE — 88173 CYTOPATH EVAL FNA REPORT: CPT

## 2021-09-28 PROCEDURE — 77065 DX MAMMO INCL CAD UNI: CPT

## 2021-09-28 PROCEDURE — 88172 CYTP DX EVAL FNA 1ST EA SITE: CPT

## 2021-09-28 PROCEDURE — 88305 TISSUE EXAM BY PATHOLOGIST: CPT

## 2021-09-28 PROCEDURE — A4648: CPT

## 2021-09-28 PROCEDURE — 19083 BX BREAST 1ST LESION US IMAG: CPT | Mod: LT

## 2021-09-28 PROCEDURE — 88305 TISSUE EXAM BY PATHOLOGIST: CPT | Mod: 26

## 2021-09-28 PROCEDURE — 19083 BX BREAST 1ST LESION US IMAG: CPT

## 2021-09-28 PROCEDURE — 88173 CYTOPATH EVAL FNA REPORT: CPT | Mod: 26

## 2021-09-30 ENCOUNTER — NON-APPOINTMENT (OUTPATIENT)
Age: 71
End: 2021-09-30

## 2021-10-14 ENCOUNTER — APPOINTMENT (OUTPATIENT)
Dept: GYNECOLOGIC ONCOLOGY | Facility: CLINIC | Age: 71
End: 2021-10-14
Payer: COMMERCIAL

## 2021-10-14 VITALS
HEART RATE: 72 BPM | DIASTOLIC BLOOD PRESSURE: 80 MMHG | HEIGHT: 66 IN | BODY MASS INDEX: 22.5 KG/M2 | WEIGHT: 140 LBS | SYSTOLIC BLOOD PRESSURE: 122 MMHG | RESPIRATION RATE: 14 BRPM

## 2021-10-14 PROCEDURE — 99213 OFFICE O/P EST LOW 20 MIN: CPT

## 2021-10-28 ENCOUNTER — OUTPATIENT (OUTPATIENT)
Dept: OUTPATIENT SERVICES | Facility: HOSPITAL | Age: 71
LOS: 1 days | Discharge: HOME | End: 2021-10-28

## 2021-10-28 DIAGNOSIS — C50.919 MALIGNANT NEOPLASM OF UNSPECIFIED SITE OF UNSPECIFIED FEMALE BREAST: ICD-10-CM

## 2021-10-29 ENCOUNTER — RESULT REVIEW (OUTPATIENT)
Age: 71
End: 2021-10-29

## 2021-10-29 ENCOUNTER — APPOINTMENT (OUTPATIENT)
Dept: HEMATOLOGY ONCOLOGY | Facility: CLINIC | Age: 71
End: 2021-10-29
Payer: COMMERCIAL

## 2021-10-29 VITALS
BODY MASS INDEX: 23.17 KG/M2 | TEMPERATURE: 97.9 F | SYSTOLIC BLOOD PRESSURE: 117 MMHG | DIASTOLIC BLOOD PRESSURE: 76 MMHG | HEIGHT: 66.1 IN | RESPIRATION RATE: 18 BRPM | HEART RATE: 68 BPM | OXYGEN SATURATION: 97 % | WEIGHT: 144.18 LBS

## 2021-10-29 DIAGNOSIS — M79.89 OTHER SPECIFIED SOFT TISSUE DISORDERS: ICD-10-CM

## 2021-10-29 LAB
BASOPHILS # BLD AUTO: 0.04 K/UL — SIGNIFICANT CHANGE UP (ref 0–0.2)
BASOPHILS NFR BLD AUTO: 0.9 % — SIGNIFICANT CHANGE UP (ref 0–2)
EOSINOPHIL # BLD AUTO: 0.25 K/UL — SIGNIFICANT CHANGE UP (ref 0–0.5)
EOSINOPHIL NFR BLD AUTO: 5.7 % — SIGNIFICANT CHANGE UP (ref 0–6)
HCT VFR BLD CALC: 41.5 % — SIGNIFICANT CHANGE UP (ref 34.5–45)
HGB BLD-MCNC: 13.5 G/DL — SIGNIFICANT CHANGE UP (ref 11.5–15.5)
IMM GRANULOCYTES NFR BLD AUTO: 0.5 % — SIGNIFICANT CHANGE UP (ref 0–1.5)
LYMPHOCYTES # BLD AUTO: 1.37 K/UL — SIGNIFICANT CHANGE UP (ref 1–3.3)
LYMPHOCYTES # BLD AUTO: 31.1 % — SIGNIFICANT CHANGE UP (ref 13–44)
MCHC RBC-ENTMCNC: 31.3 PG — SIGNIFICANT CHANGE UP (ref 27–34)
MCHC RBC-ENTMCNC: 32.5 G/DL — SIGNIFICANT CHANGE UP (ref 32–36)
MCV RBC AUTO: 96.3 FL — SIGNIFICANT CHANGE UP (ref 80–100)
MONOCYTES # BLD AUTO: 0.47 K/UL — SIGNIFICANT CHANGE UP (ref 0–0.9)
MONOCYTES NFR BLD AUTO: 10.7 % — SIGNIFICANT CHANGE UP (ref 2–14)
NEUTROPHILS # BLD AUTO: 2.25 K/UL — SIGNIFICANT CHANGE UP (ref 1.8–7.4)
NEUTROPHILS NFR BLD AUTO: 51.1 % — SIGNIFICANT CHANGE UP (ref 43–77)
NRBC # BLD: 0 /100 WBCS — SIGNIFICANT CHANGE UP (ref 0–0)
PLATELET # BLD AUTO: 176 K/UL — SIGNIFICANT CHANGE UP (ref 150–400)
RBC # BLD: 4.31 M/UL — SIGNIFICANT CHANGE UP (ref 3.8–5.2)
RBC # FLD: 13.2 % — SIGNIFICANT CHANGE UP (ref 10.3–14.5)
WBC # BLD: 4.4 K/UL — SIGNIFICANT CHANGE UP (ref 3.8–10.5)
WBC # FLD AUTO: 4.4 K/UL — SIGNIFICANT CHANGE UP (ref 3.8–10.5)

## 2021-10-29 PROCEDURE — 99214 OFFICE O/P EST MOD 30 MIN: CPT

## 2021-10-29 NOTE — PHYSICAL EXAM
[Fully active, able to carry on all pre-disease performance without restriction] : Status 0 - Fully active, able to carry on all pre-disease performance without restriction [Normal] : affect appropriate [de-identified] : Bilateral mastectomies with silicone implants, no chest wall mass, left axillary LAD multiple likely 2/2 to recent COVID booster [de-identified] : Vitiligo; left arm erythema at injection site and axilla, warm to touch

## 2021-10-29 NOTE — HISTORY OF PRESENT ILLNESS
[Disease: _____________________] : Disease: [unfilled] [T: ___] : T[unfilled] [N: ___] : N[unfilled] [M: ___] : M[unfilled] [AJCC Stage: ____] : AJCC Stage: [unfilled] [Treatment Protocol] : Treatment Protocol [de-identified] : The patient presented in 2009, at age 58, with the mass she discovered on BSE.\par \par Ultimately Dr. Ignacia Sidhu performed a right mastectomy and right axillary lymph node dissection followed by placement of a saline expander by Dr. Jozef Saini. There were  2 foci of infiltrating ductal carcinoma 4.5 cm apart from each other measuring 1.4 and 0.7 cm respectively. Both tumors were ER positive, MI positive,  and HER-2/syd negative. 1/8 lymph nodes was positive for metastatic disease.\par \par Postoperatively the patient received 4 cycles of TC chemotherapy extending from June 22, 2009 through August 24, 2009. \par \par In September 2009 she started anastrozole completed in 1/2020.\par \par In 2011 the patient underwent genetic testing and was found to have a deleterious mutation of BRCA1 (5385 ins C). \par \par On October 7, 2011 the patient underwent risk reduction BSO and left simple mastectomy / sentinel node biopsy. Pathology from all  specimens was benign. [de-identified] : Multifocal well differentiated  IDC ER+MA+Kch0umk- [FreeTextEntry1] : Arimidex start 09/22/2009 - 1/2020 [de-identified] : \par On active surveillance.\par Left arm swelling and erythema at site of injection and migrating to axilla after MODERNA booster yesterday 10/28. \par Last saw breast surgeon Dr Sidhu 6/7/21, exam OK.\par Patient contacted plastic surgeon, her implants were not recalled.\par MRI reconstructed breasts 10/3/2020 stable silicone implants stable, BI RADS 2.\par US reconstructed breasts 9/3/21 - two abnormal appearing LN in left axilla, biopsy rec; US biopsy of left axilla negative for malignant cells.\par Labs today\par \par HEALTH MAINTENANCE\par PCP/CARDS Dr. Rodriguez last seen 7/2/21\par OPHTHO b/l cataracts Dr. Cuevas 7/6/21; given restasis? eye drops\par Last GYN oncology exam 10/14/21 with Dr Lemus.  No vaginal bleeding or spotting.\par GI Dr. Girish Holguin colonoscopy 7/30/21 - 3mm sessile polyp, diverticulosis; CT A/P tiny 2mm calcification in the body of pancreas, may be related to minimal chronic hepatitis, no ductal dilatation or inflammatory changes, renal calculus, no acute abdominal findings.\par Last saw endocrinologist Dr Acharya 11/4/2019. Was told to DC ibandronate when she completed anastrozole. Patient DC'd ibandronate in 1/2020.\par ORTHO 11/2021 intermittent, seeing for b/l knee, hip pain (is the worst) and back pain; Xray of right hip showed arthritis; maybe pinched nerve, improved with PT.  \par RHEUM seeing in 3 weeks for generalized complaints - dry eye, stiff neck, dry mouth, palpitations, lightheaded; referred by GYN ONC\par h/o mechanical falls, runner; recently feeling unsteady 1.5mos, feels she has to be cautious when walking to avoid falls, no dizziness, headaches, blurry vision, no syncope; saw PCP/ophtho/ ENT Dr. 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 Yoli Acharya 8/17/20- increased bone density, continue off tx, repeat DXA in 2 years.\par NEURO brain unremarkable 9/1/21 for lightheadedness/vertigo. 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

## 2021-10-29 NOTE — ASSESSMENT
[FreeTextEntry1] : Multifocal well differentiated infiltrating ductal carcinoma, \par     [id number], ER+ IA+ Her2 syd -\par      Stage IB (AJCC 8 edition)\par     Status post mastectomy/ALND.\par     Status post TC x 4.\par     On anastrozole 10 years , off x 10 months\par     Clinically [facility]IANE\par     recent labs reviewed\par Deleterious mutation BRCA1\par     s/p bilateral mastectomies after diagnosis of right breast cancer\par     s/p risk reduction BSO\par     also being followed by Dr Lemus in GYN oncology. \par     Dr. Ignacia Sidhu -Breast Surgeon\par Left arm axillary adenopathy\par     Due to recent COVID booster \par     had recent left axillary LN biopsy which was benign.\par     also with erythema and warmth at injection site and left axilla; pt afebrile today\par     advised pt to monitor and call for abx if worsening  [FreeTextEntry2] : \par

## 2021-10-29 NOTE — REVIEW OF SYSTEMS
[Anxiety] : anxiety [Negative] : Allergic/Immunologic [FreeTextEntry9] : Most recent  BMD 8/17/2020. Saw orthopedist for right hip pain. [de-identified] : Had whole body skin check 10/2020. Had biopsy of lesion of leg, ws told she had precancerous lesion, had re-excision with plastic surgeon. [de-identified] : Insomnia and anxiety controlled with lorazepam.

## 2021-11-01 LAB
25(OH)D3 SERPL-MCNC: 41 NG/ML
ALBUMIN SERPL ELPH-MCNC: 4.1 G/DL
ALP BLD-CCNC: 88 U/L
ALT SERPL-CCNC: 34 U/L
ANION GAP SERPL CALC-SCNC: 11 MMOL/L
AST SERPL-CCNC: 37 U/L
BILIRUB SERPL-MCNC: 0.2 MG/DL
BUN SERPL-MCNC: 18 MG/DL
CALCIUM SERPL-MCNC: 9.4 MG/DL
CHLORIDE SERPL-SCNC: 103 MMOL/L
CHOLEST SERPL-MCNC: 220 MG/DL
CO2 SERPL-SCNC: 25 MMOL/L
CREAT SERPL-MCNC: 0.79 MG/DL
ESTIMATED AVERAGE GLUCOSE: 108 MG/DL
GLUCOSE SERPL-MCNC: 96 MG/DL
HBA1C MFR BLD HPLC: 5.4 %
HDLC SERPL-MCNC: 56 MG/DL
LDLC SERPL CALC-MCNC: 138 MG/DL
NONHDLC SERPL-MCNC: 164 MG/DL
POTASSIUM SERPL-SCNC: 4.1 MMOL/L
PROT SERPL-MCNC: 6.8 G/DL
SODIUM SERPL-SCNC: 139 MMOL/L
TRIGL SERPL-MCNC: 130 MG/DL

## 2021-11-03 ENCOUNTER — NON-APPOINTMENT (OUTPATIENT)
Age: 71
End: 2021-11-03

## 2021-11-04 ENCOUNTER — APPOINTMENT (OUTPATIENT)
Dept: RHEUMATOLOGY | Facility: CLINIC | Age: 71
End: 2021-11-04
Payer: COMMERCIAL

## 2021-11-04 ENCOUNTER — LABORATORY RESULT (OUTPATIENT)
Age: 71
End: 2021-11-04

## 2021-11-04 VITALS
SYSTOLIC BLOOD PRESSURE: 145 MMHG | BODY MASS INDEX: 22.66 KG/M2 | OXYGEN SATURATION: 98 % | HEART RATE: 81 BPM | WEIGHT: 141 LBS | TEMPERATURE: 97.2 F | HEIGHT: 66.1 IN | DIASTOLIC BLOOD PRESSURE: 88 MMHG

## 2021-11-04 DIAGNOSIS — E55.9 VITAMIN D DEFICIENCY, UNSPECIFIED: ICD-10-CM

## 2021-11-04 DIAGNOSIS — F41.9 ANXIETY DISORDER, UNSPECIFIED: ICD-10-CM

## 2021-11-04 DIAGNOSIS — N89.8 OTHER SPECIFIED NONINFLAMMATORY DISORDERS OF VAGINA: ICD-10-CM

## 2021-11-04 PROCEDURE — 99205 OFFICE O/P NEW HI 60 MIN: CPT | Mod: GC

## 2021-11-05 LAB
25(OH)D3 SERPL-MCNC: 43.9 NG/ML
ALBUMIN SERPL ELPH-MCNC: 4.7 G/DL
ALP BLD-CCNC: 86 U/L
ALT SERPL-CCNC: 22 U/L
AMYLASE/CREAT SERPL: 105 U/L
ANION GAP SERPL CALC-SCNC: 14 MMOL/L
AST SERPL-CCNC: 22 U/L
BASOPHILS # BLD AUTO: 0.05 K/UL
BASOPHILS NFR BLD AUTO: 0.8 %
BILIRUB SERPL-MCNC: 0.4 MG/DL
BUN SERPL-MCNC: 18 MG/DL
CALCIUM SERPL-MCNC: 10.3 MG/DL
CHLORIDE SERPL-SCNC: 103 MMOL/L
CO2 SERPL-SCNC: 23 MMOL/L
CREAT SERPL-MCNC: 0.8 MG/DL
CRP SERPL-MCNC: <3 MG/L
DEPRECATED KAPPA LC FREE/LAMBDA SER: 1.25 RATIO
EOSINOPHIL # BLD AUTO: 0.1 K/UL
EOSINOPHIL NFR BLD AUTO: 1.7 %
ERYTHROCYTE [SEDIMENTATION RATE] IN BLOOD BY WESTERGREN METHOD: 28 MM/HR
GLUCOSE SERPL-MCNC: 90 MG/DL
HCT VFR BLD CALC: 45.3 %
HGB BLD-MCNC: 14.3 G/DL
IGA SER QL IEP: 255 MG/DL
IGG SER QL IEP: 1054 MG/DL
IGM SER QL IEP: 47 MG/DL
IMM GRANULOCYTES NFR BLD AUTO: 0.3 %
KAPPA LC CSF-MCNC: 1.42 MG/DL
KAPPA LC SERPL-MCNC: 1.78 MG/DL
LPL SERPL-CCNC: 38 U/L
LYMPHOCYTES # BLD AUTO: 1.73 K/UL
LYMPHOCYTES NFR BLD AUTO: 29.2 %
MAN DIFF?: NORMAL
MCHC RBC-ENTMCNC: 30.5 PG
MCHC RBC-ENTMCNC: 31.6 GM/DL
MCV RBC AUTO: 96.6 FL
MONOCYTES # BLD AUTO: 0.38 K/UL
MONOCYTES NFR BLD AUTO: 6.4 %
NEUTROPHILS # BLD AUTO: 3.65 K/UL
NEUTROPHILS NFR BLD AUTO: 61.6 %
PLATELET # BLD AUTO: 281 K/UL
POTASSIUM SERPL-SCNC: 5.2 MMOL/L
PROT SERPL-MCNC: 7 G/DL
RBC # BLD: 4.69 M/UL
RBC # FLD: 13.2 %
RHEUMATOID FACT SER QL: <10 IU/ML
SODIUM SERPL-SCNC: 141 MMOL/L
WBC # FLD AUTO: 5.93 K/UL

## 2021-11-08 LAB
24R-OH-CALCIDIOL SERPL-MCNC: 58.7 PG/ML
ENA SS-A AB SER IA-ACNC: <0.2 AL
ENA SS-B AB SER IA-ACNC: <0.2 AL
IGG4 SER-MCNC: 67 MG/DL

## 2021-11-09 LAB
ACE BLD-CCNC: 42 U/L
ANA SER IF-ACNC: NEGATIVE
G6PD SER-CCNC: 15.3 U/G HGB
MPO AB + PR3 PNL SER: NORMAL

## 2021-11-10 ENCOUNTER — NON-APPOINTMENT (OUTPATIENT)
Age: 71
End: 2021-11-10

## 2021-11-16 ENCOUNTER — APPOINTMENT (OUTPATIENT)
Dept: PEDIATRIC ALLERGY IMMUNOLOGY | Facility: CLINIC | Age: 71
End: 2021-11-16

## 2021-11-16 ENCOUNTER — NON-APPOINTMENT (OUTPATIENT)
Age: 71
End: 2021-11-16

## 2021-11-17 ENCOUNTER — APPOINTMENT (OUTPATIENT)
Dept: ORTHOPEDIC SURGERY | Facility: CLINIC | Age: 71
End: 2021-11-17
Payer: COMMERCIAL

## 2021-11-17 VITALS — BODY MASS INDEX: 22.66 KG/M2 | WEIGHT: 141 LBS | HEIGHT: 66 IN

## 2021-11-17 DIAGNOSIS — M54.2 CERVICALGIA: ICD-10-CM

## 2021-11-17 DIAGNOSIS — M47.812 SPONDYLOSIS W/OUT MYELOPATHY OR RADICULOPATHY, CERVICAL REGION: ICD-10-CM

## 2021-11-17 DIAGNOSIS — M54.50 LOW BACK PAIN, UNSPECIFIED: ICD-10-CM

## 2021-11-17 PROCEDURE — 99204 OFFICE O/P NEW MOD 45 MIN: CPT

## 2021-11-18 NOTE — REASON FOR VISIT
[FreeTextEntry1] : [facility] [phone] at [address] [place] [address] \par \par Pt is here for a one year follow-up.\par \par Personal h/o breast cancer (s/p bilateral mastectomy)\par BRCA 1 mutation s/p rrBSO (10/7/11)

## 2021-11-18 NOTE — HISTORY OF PRESENT ILLNESS
[FreeTextEntry1] : 71 year old returns for interval oncologic surveillance offering no new complaints including no abdominopelvic pain, vaginal or rectal bleeding, chest pain or shortness of breath. Normal bowel and bladder function. Patient reports feeling dizzy, dry eyes, and stiff neck. Patient is still physically active. \par \par The patient is fully vaccinated for Covid-19\par \par Lymph node biopsy done on 9/28/21 by Dr. Jenna Azar ([facility]) - benign findings. \par \par Brain MRI was negative, per pt. ([facility] radiology in [place]). \par \par DEXA - UTD per patient. \par \par Colonoscopy - UTD per patient, one benign polyp.

## 2021-11-18 NOTE — PHYSICAL EXAM
[Abnormal] : Parametria: Abnormal [Normal] : Examination of extremities for edema and/or varicosities: Normal [de-identified] : intact  [de-identified] : no irregularity, no nodularity. [de-identified] : Keyanna Horne MA was present the entire time of gynecological exam.

## 2021-11-18 NOTE — END OF VISIT
[FreeTextEntry3] : Written by Keyanna Horne, acting as a scribe for Dr. Shauna Lemus.\par This note accurately reflects the work and decisions made by me.

## 2021-11-18 NOTE — ASSESSMENT
[FreeTextEntry1] : 70 y/o patient with a personal hx of breast cancer & a BRCA1 mutation. Patient should f/u with rheumatologist, Dr. Garland for the symptoms she's experiencing. I advised the patient if there is no resolution after f/u with rheumatology, she should see Allergy & Immunology. The patient understands and agrees to comply.

## 2021-11-23 ENCOUNTER — APPOINTMENT (OUTPATIENT)
Dept: HEMATOLOGY ONCOLOGY | Facility: CLINIC | Age: 71
End: 2021-11-23
Payer: COMMERCIAL

## 2021-11-23 DIAGNOSIS — M51.36 OTHER INTERVERTEBRAL DISC DEGENERATION, LUMBAR REGION: ICD-10-CM

## 2021-11-23 DIAGNOSIS — R59.0 LOCALIZED ENLARGED LYMPH NODES: ICD-10-CM

## 2021-11-23 PROCEDURE — 99214 OFFICE O/P EST MOD 30 MIN: CPT

## 2021-11-24 PROBLEM — R59.0 AXILLARY ADENOPATHY: Status: ACTIVE | Noted: 2021-09-03

## 2021-11-24 PROBLEM — M51.36 DISC DEGENERATION, LUMBAR: Status: ACTIVE | Noted: 2021-11-17

## 2021-11-24 NOTE — PHYSICAL EXAM
[Fully active, able to carry on all pre-disease performance without restriction] : Status 0 - Fully active, able to carry on all pre-disease performance without restriction [Normal] : affect appropriate [de-identified] : Bilateral mastectomies with silicone implants, no chest wall mass, left axillary LAD multiple persistent nodes [de-identified] : Vitiligo;  [de-identified] : STrength 4/4 b/l lower extremities

## 2021-11-24 NOTE — REVIEW OF SYSTEMS
[Anxiety] : anxiety [Negative] : Allergic/Immunologic [FreeTextEntry9] : Most recent  BMD 8/17/2020. Saw orthopedist for right hip pain.; low back pain with b/l numbness of feet;  [de-identified] : Had whole body skin check 10/2020. Had biopsy of lesion of leg, ws told she had precancerous lesion, had re-excision with plastic surgeon. [de-identified] : Insomnia and anxiety controlled with lorazepam.

## 2021-11-24 NOTE — HISTORY OF PRESENT ILLNESS
[Disease: _____________________] : Disease: [unfilled] [T: ___] : T[unfilled] [N: ___] : N[unfilled] [M: ___] : M[unfilled] [AJCC Stage: ____] : AJCC Stage: [unfilled] [Treatment Protocol] : Treatment Protocol [de-identified] : The patient presented in 2009, at age 58, with the mass she discovered on BSE.\par \par Ultimately Dr. Ignacia Sidhu performed a right mastectomy and right axillary lymph node dissection followed by placement of a saline expander by Dr. Jozef Saini. There were  2 foci of infiltrating ductal carcinoma 4.5 cm apart from each other measuring 1.4 and 0.7 cm respectively. Both tumors were ER positive, AR positive,  and HER-2/syd negative. 1/8 lymph nodes was positive for metastatic disease.\par \par Postoperatively the patient received 4 cycles of TC chemotherapy extending from June 22, 2009 through August 24, 2009. \par \par In September 2009 she started anastrozole completed in 1/2020.\par \par In 2011 the patient underwent genetic testing and was found to have a deleterious mutation of BRCA1 (5385 ins C). \par \par On October 7, 2011 the patient underwent risk reduction BSO and left simple mastectomy / sentinel node biopsy. Pathology from all  specimens was benign. [de-identified] : Multifocal well differentiated  IDC ER+MS+Ebu6pdg- [FreeTextEntry1] : Arimidex start 09/22/2009 - 1/2020 [de-identified] : 11/24/21\par On active surveillance.  Patient presents urgently with concerns related to b/l breast swelling that is uncomfortable and left axillary discomfort - all of which started after COVID booster.  She feels like her implants have gotten bigger; no fever\par Left arm swelling and erythema at site of injection resolved after last visit\par Patient also was seen by rheumatology and orthopedics for chronic low back pain and neck stiffness and b;l neuropathy of the feet.  Rheum workup negative; ortho workup xrays - demonstrate degenerative changes; patient referred to neurology for further workup up by ortho. \par \par Last saw breast surgeon Dr Sidhu 6/7/21, exam OK.\par Patient contacted plastic surgeon, her implants were not recalled.\par MRI reconstructed breasts 10/3/2020 stable silicone implants stable, BI RADS 2.\par US reconstructed breasts 9/3/21 - two abnormal appearing LN in left axilla; US biopsy of left axilla negative for malignant cells.\par \par \par HEALTH MAINTENANCE\par PCP/CARDS Dr. Rodriguez last seen 7/2/21\par OPHTHO b/l cataracts Dr. Cuevas 7/6/21; given restasis? eye drops\par Last GYN oncology exam 10/14/21 with Dr Lemus.  No vaginal bleeding or spotting.\par GI Dr. Girish Holguin colonoscopy 7/30/21 - 3mm sessile polyp, diverticulosis; CT A/P tiny 2mm calcification in the body of pancreas, may be related to minimal chronic hepatitis, no ductal dilatation or inflammatory changes, renal calculus, no acute abdominal findings.\par Last saw endocrinologist Dr Acharya 11/4/2019. Was told to DC ibandronate when she completed anastrozole. Patient DC'd ibandronate in 1/2020.\par ORTHO 11/2021 intermittent, seeing for b/l knee, hip pain (is the worst) and back pain; Xray of right hip showed arthritis; maybe pinched nerve, improved with PT.  \par RHEUM seeing in 3 weeks for generalized complaints - dry eye, stiff neck, dry mouth, palpitations, lightheaded; referred by GYN ONC\par h/o mechanical falls, runner; recently feeling unsteady 1.5mos, feels she has to be cautious when walking to avoid falls, no dizziness, headaches, blurry vision, no syncope; saw PCP/ophtho/ ENT Dr. 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 Yoli Achayra 8/17/20- increased bone density, continue off tx, repeat DXA in 2 years.\par NEURO brain unremarkable 9/1/21 for lightheadedness/vertigo. 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

## 2021-11-24 NOTE — ASSESSMENT
[FreeTextEntry1] : Multifocal well differentiated infiltrating ductal carcinoma, \par     [id number], ER+ [place]+ Her2 syd -\par      Stage IB (AJCC 8 edition)\par     Status post mastectomy/ALND.\par     Status post TC x 4.\par     On anastrozole 10 years , off x 10 months\par     Clinically [facility]\par     recent labs reviewed\par Deleterious mutation BRCA1\par     s/p bilateral mastectomies after diagnosis of right breast cancer\par     s/p risk reduction BSO\par     also being followed by Dr Lemus in GYN oncology. \par     Dr. Ignacia Sidhu -Breast Surgeon\par Left arm axillary adenopathy - persistant\par     Likely residual from COVID booster 10/28/21\par     Breast US 9/2021\par     had recent left axillary LN biopsy which was benign.\par     patient requesting repeat breast US at this time \par Lumbar Pain/ Neuropathy\par    REc: MRI L-spine to further assess\par    F/up w/ neurology.  [FreeTextEntry2] : \par

## 2021-11-30 ENCOUNTER — APPOINTMENT (OUTPATIENT)
Dept: HEMATOLOGY ONCOLOGY | Facility: CLINIC | Age: 71
End: 2021-11-30

## 2021-11-30 ENCOUNTER — NON-APPOINTMENT (OUTPATIENT)
Age: 71
End: 2021-11-30

## 2022-02-16 ENCOUNTER — RX RENEWAL (OUTPATIENT)
Age: 72
End: 2022-02-16

## 2022-02-21 ENCOUNTER — NON-APPOINTMENT (OUTPATIENT)
Age: 72
End: 2022-02-21

## 2022-02-23 ENCOUNTER — APPOINTMENT (OUTPATIENT)
Dept: ORTHOPEDIC SURGERY | Facility: CLINIC | Age: 72
End: 2022-02-23
Payer: COMMERCIAL

## 2022-02-23 DIAGNOSIS — M16.12 UNILATERAL PRIMARY OSTEOARTHRITIS, LEFT HIP: ICD-10-CM

## 2022-02-23 DIAGNOSIS — M70.62 TROCHANTERIC BURSITIS, LEFT HIP: ICD-10-CM

## 2022-02-23 DIAGNOSIS — M17.0 BILATERAL PRIMARY OSTEOARTHRITIS OF KNEE: ICD-10-CM

## 2022-02-23 PROCEDURE — 99214 OFFICE O/P EST MOD 30 MIN: CPT | Mod: 25

## 2022-02-23 PROCEDURE — 73562 X-RAY EXAM OF KNEE 3: CPT | Mod: LT

## 2022-02-23 PROCEDURE — 73502 X-RAY EXAM HIP UNI 2-3 VIEWS: CPT | Mod: LT

## 2022-02-23 PROCEDURE — 20610 DRAIN/INJ JOINT/BURSA W/O US: CPT | Mod: LT

## 2022-03-05 ENCOUNTER — OUTPATIENT (OUTPATIENT)
Dept: OUTPATIENT SERVICES | Facility: HOSPITAL | Age: 72
LOS: 1 days | End: 2022-03-05
Payer: COMMERCIAL

## 2022-03-05 ENCOUNTER — RESULT REVIEW (OUTPATIENT)
Age: 72
End: 2022-03-05

## 2022-03-05 ENCOUNTER — APPOINTMENT (OUTPATIENT)
Dept: ULTRASOUND IMAGING | Facility: IMAGING CENTER | Age: 72
End: 2022-03-05
Payer: COMMERCIAL

## 2022-03-05 DIAGNOSIS — C50.911 MALIGNANT NEOPLASM OF UNSPECIFIED SITE OF RIGHT FEMALE BREAST: ICD-10-CM

## 2022-03-05 PROCEDURE — 76641 ULTRASOUND BREAST COMPLETE: CPT | Mod: 26,50

## 2022-03-05 PROCEDURE — 76641 ULTRASOUND BREAST COMPLETE: CPT

## 2022-03-22 RX ORDER — IBUPROFEN 600 MG/1
600 TABLET, FILM COATED ORAL
Qty: 90 | Refills: 0 | Status: ACTIVE | COMMUNITY
Start: 2021-11-17 | End: 1900-01-01

## 2022-05-03 ENCOUNTER — OUTPATIENT (OUTPATIENT)
Dept: OUTPATIENT SERVICES | Facility: HOSPITAL | Age: 72
LOS: 1 days | Discharge: HOME | End: 2022-05-03

## 2022-05-03 DIAGNOSIS — C50.919 MALIGNANT NEOPLASM OF UNSPECIFIED SITE OF UNSPECIFIED FEMALE BREAST: ICD-10-CM

## 2022-05-06 ENCOUNTER — RESULT REVIEW (OUTPATIENT)
Age: 72
End: 2022-05-06

## 2022-05-06 ENCOUNTER — APPOINTMENT (OUTPATIENT)
Dept: HEMATOLOGY ONCOLOGY | Facility: CLINIC | Age: 72
End: 2022-05-06
Payer: COMMERCIAL

## 2022-05-06 VITALS
WEIGHT: 147.93 LBS | HEART RATE: 79 BPM | TEMPERATURE: 97 F | BODY MASS INDEX: 23.77 KG/M2 | DIASTOLIC BLOOD PRESSURE: 83 MMHG | HEIGHT: 65.98 IN | OXYGEN SATURATION: 96 % | SYSTOLIC BLOOD PRESSURE: 129 MMHG | RESPIRATION RATE: 16 BRPM

## 2022-05-06 DIAGNOSIS — M85.80 OTHER SPECIFIED DISORDERS OF BONE DENSITY AND STRUCTURE, UNSPECIFIED SITE: ICD-10-CM

## 2022-05-06 LAB
BASOPHILS # BLD AUTO: 0.07 K/UL — SIGNIFICANT CHANGE UP (ref 0–0.2)
BASOPHILS NFR BLD AUTO: 1.2 % — SIGNIFICANT CHANGE UP (ref 0–2)
EOSINOPHIL # BLD AUTO: 0.13 K/UL — SIGNIFICANT CHANGE UP (ref 0–0.5)
EOSINOPHIL NFR BLD AUTO: 2.3 % — SIGNIFICANT CHANGE UP (ref 0–6)
HCT VFR BLD CALC: 40.6 % — SIGNIFICANT CHANGE UP (ref 34.5–45)
HGB BLD-MCNC: 13.1 G/DL — SIGNIFICANT CHANGE UP (ref 11.5–15.5)
IMM GRANULOCYTES NFR BLD AUTO: 0.2 % — SIGNIFICANT CHANGE UP (ref 0–1.5)
LYMPHOCYTES # BLD AUTO: 1.66 K/UL — SIGNIFICANT CHANGE UP (ref 1–3.3)
LYMPHOCYTES # BLD AUTO: 29.2 % — SIGNIFICANT CHANGE UP (ref 13–44)
MCHC RBC-ENTMCNC: 31.6 PG — SIGNIFICANT CHANGE UP (ref 27–34)
MCHC RBC-ENTMCNC: 32.3 G/DL — SIGNIFICANT CHANGE UP (ref 32–36)
MCV RBC AUTO: 98.1 FL — SIGNIFICANT CHANGE UP (ref 80–100)
MONOCYTES # BLD AUTO: 0.45 K/UL — SIGNIFICANT CHANGE UP (ref 0–0.9)
MONOCYTES NFR BLD AUTO: 7.9 % — SIGNIFICANT CHANGE UP (ref 2–14)
NEUTROPHILS # BLD AUTO: 3.37 K/UL — SIGNIFICANT CHANGE UP (ref 1.8–7.4)
NEUTROPHILS NFR BLD AUTO: 59.2 % — SIGNIFICANT CHANGE UP (ref 43–77)
NRBC # BLD: 0 /100 WBCS — SIGNIFICANT CHANGE UP (ref 0–0)
PLATELET # BLD AUTO: 204 K/UL — SIGNIFICANT CHANGE UP (ref 150–400)
RBC # BLD: 4.14 M/UL — SIGNIFICANT CHANGE UP (ref 3.8–5.2)
RBC # FLD: 12.9 % — SIGNIFICANT CHANGE UP (ref 10.3–14.5)
WBC # BLD: 5.69 K/UL — SIGNIFICANT CHANGE UP (ref 3.8–10.5)
WBC # FLD AUTO: 5.69 K/UL — SIGNIFICANT CHANGE UP (ref 3.8–10.5)

## 2022-05-06 PROCEDURE — 99214 OFFICE O/P EST MOD 30 MIN: CPT

## 2022-05-06 NOTE — REVIEW OF SYSTEMS
[Negative] : Allergic/Immunologic [de-identified] : Had whole body skin check 10/2020. Had biopsy of lesion of leg, ws told she had precancerous lesion, had re-excision with plastic surgeon. [de-identified] : Insomnia and anxiety controlled with lorazepam.

## 2022-05-06 NOTE — PHYSICAL EXAM
[Fully active, able to carry on all pre-disease performance without restriction] : Status 0 - Fully active, able to carry on all pre-disease performance without restriction [Normal] : affect appropriate [de-identified] : Bilateral mastectomies with silicone implants, no chest wall mass, no axillary LAD b/l [de-identified] : Vitiligo;  [de-identified] : STrength 4/4 b/l lower extremities

## 2022-05-06 NOTE — HISTORY OF PRESENT ILLNESS
[Disease: _____________________] : Disease: [unfilled] [T: ___] : T[unfilled] [N: ___] : N[unfilled] [M: ___] : M[unfilled] [AJCC Stage: ____] : AJCC Stage: [unfilled] [Treatment Protocol] : Treatment Protocol [de-identified] : The patient presented in 2009, at age 58, with the mass she discovered on BSE.\par \par Ultimately Dr. Ignacia Sidhu performed a right mastectomy and right axillary lymph node dissection followed by placement of a saline expander by Dr. Jozef Saini. There were  2 foci of infiltrating ductal carcinoma 4.5 cm apart from each other measuring 1.4 and 0.7 cm respectively. Both tumors were ER positive, [place] positive,  and HER-2/syd negative. 1/8 lymph nodes was positive for metastatic disease.\par \par Postoperatively the patient received 4 cycles of TC chemotherapy extending from June 22, 2009 through August 24, 2009. \par \par In September 2009 she started anastrozole completed in 1/2020.\par \par In 2011 the patient underwent genetic testing and was found to have a deleterious mutation of BRCA1 (5385 ins C). \par \par On October 7, 2011 the patient underwent risk reduction BSO and left simple mastectomy / sentinel node biopsy. Pathology from all  specimens was benign. [de-identified] : Multifocal well differentiated  IDC ER+CO+Vdq6afu- [de-identified] : BRCA1 mutation [FreeTextEntry1] : Arimidex start 09/22/2009 - 1/2020 [de-identified] : \par On active surveillance. \par started gluten free diet 2 weeks ago\par Breast US 9/2021 had recent left axillary LN biopsy which was benign. Left arm axillary adenopathy - persistent likely residual from COVID booster 10/28/21\par chronic low back pain and neck stiffness and b;l neuropathy of the feet.  Rheum workup negative; ortho workup neg xrays - demonstrate degenerative changes; s/p left hip cortisone injection; difficult to exercise and run with hip pain\par Last saw breast surgeon Dr. Ignacia Sidhu 6/7/21, exam OK. follows yearly.\par Patient contacted plastic surgeon, her implants were not recalled.\par MRI reconstructed breasts 10/3/2020 stable silicone implants stable, BI RADS 2.\par Labs today\par \par HEALTH MAINTENANCE\par PCP/CARDS Dr. Rodriguez last seen 7/2/21\par OPHTHO b/l cataracts Dr. Cuevas 7/6/21; given restasis? eye drops\par Last GYN oncology exam 10/14/21 with Dr Lemus.  No vaginal bleeding or spotting.\par GI Dr. Girish Holguin colonoscopy 7/30/21 - 3mm sessile polyp, diverticulosis; CT A/P tiny 2mm calcification in the body of pancreas, may be related to minimal chronic hepatitis, no ductal dilatation or inflammatory changes, renal calculus, no acute abdominal findings.\par Endocrinologist Dr Acharya 11/4/2019. Was told to DC ibandronate when she completed anastrozole. Patient DC'd ibandronate in 1/2020.\par ORTHO 11/2021 intermittent, seeing for b/l knee, hip pain (is the worst) and back pain; Xray of right hip showed arthritis; maybe pinched nerve, improved with PT.  \par RHEUM saw generalized complaints - dry eye, stiff neck, dry mouth, palpitations, lightheaded; w/u neg for autoimmune dx\par h/o mechanical falls, runner; recently feeling unsteady 1.5mos, feels she has to be cautious when walking to avoid falls, no dizziness, headaches, blurry vision, no syncope; saw PCP/ophtho/ ENT Dr. 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 Yoli Acharya 8/17/20- increased bone density, continue off tx, repeat DXA in 2 years.\par NEURO brain unremarkable 9/1/21 for lightheadedness/vertigo. 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

## 2022-05-06 NOTE — ASSESSMENT
[FreeTextEntry1] : Multifocal well differentiated infiltrating ductal carcinoma, \par     [id number], ER+ [place]+ Her2 syd -\par      Stage IB (AJCC 8 edition)\par     Status post mastectomy/ALND.\par     Status post TC x 4.\par     Completed anastrozole 10 years, now on active surveillance\par     Clinically [facility]\par     labs today\par Deleterious mutation BRCA1\par     s/p bilateral mastectomies after diagnosis of right breast cancer\par     s/p risk reduction BSO\par     also being followed by Dr Lemus in GYN oncology. \par     Dr. Ignacia Sidhu -Breast Surgeon\par     FU in 6mos [FreeTextEntry2] : \par

## 2022-05-09 LAB
25(OH)D3 SERPL-MCNC: 43.3 NG/ML
ALBUMIN SERPL ELPH-MCNC: 4.4 G/DL
ALP BLD-CCNC: 58 U/L
ALT SERPL-CCNC: 21 U/L
ANION GAP SERPL CALC-SCNC: 13 MMOL/L
AST SERPL-CCNC: 22 U/L
BILIRUB SERPL-MCNC: 0.3 MG/DL
BUN SERPL-MCNC: 12 MG/DL
CALCIUM SERPL-MCNC: 9.3 MG/DL
CHLORIDE SERPL-SCNC: 104 MMOL/L
CHOLEST SERPL-MCNC: 216 MG/DL
CO2 SERPL-SCNC: 25 MMOL/L
CREAT SERPL-MCNC: 0.89 MG/DL
EGFR: 69 ML/MIN/1.73M2
GLUCOSE SERPL-MCNC: 89 MG/DL
HDLC SERPL-MCNC: 54 MG/DL
LDLC SERPL CALC-MCNC: 133 MG/DL
NONHDLC SERPL-MCNC: 162 MG/DL
POTASSIUM SERPL-SCNC: 4 MMOL/L
PROT SERPL-MCNC: 6.7 G/DL
SODIUM SERPL-SCNC: 142 MMOL/L
TRIGL SERPL-MCNC: 144 MG/DL

## 2022-07-11 ENCOUNTER — APPOINTMENT (OUTPATIENT)
Dept: ORTHOPEDIC SURGERY | Facility: CLINIC | Age: 72
End: 2022-07-11

## 2022-07-11 VITALS
BODY MASS INDEX: 23.14 KG/M2 | SYSTOLIC BLOOD PRESSURE: 147 MMHG | HEART RATE: 76 BPM | WEIGHT: 144 LBS | HEIGHT: 66 IN | DIASTOLIC BLOOD PRESSURE: 87 MMHG

## 2022-07-11 DIAGNOSIS — M25.551 PAIN IN RIGHT HIP: ICD-10-CM

## 2022-07-11 PROCEDURE — 72100 X-RAY EXAM L-S SPINE 2/3 VWS: CPT

## 2022-07-11 PROCEDURE — 99213 OFFICE O/P EST LOW 20 MIN: CPT

## 2022-07-11 PROCEDURE — 73562 X-RAY EXAM OF KNEE 3: CPT | Mod: LT

## 2022-07-11 PROCEDURE — 73522 X-RAY EXAM HIPS BI 3-4 VIEWS: CPT

## 2022-07-11 NOTE — DISCUSSION/SUMMARY
[All Questions Answered] : Patient (and family) had all questions answered to an agreeable level of satisfaction [Interested in Proceeding] : Patient (and family) expressed understanding and interest in proceeding with the plan as outlined [de-identified] : Has hip and knee pain but it looks like this may be coming from her spine.  Looking at her spine it surprising that she does not have right-sided pain but she has no back pain or right-sided pain.  Regardless I think she should see a spine surgeon for evaluation of this.  Otherwise she can do physical therapy for both her back hip and knee.  This does not seem neoplastic in nature.\par \par If imaging was ordered, the patient was told to make an appointment to review findings right after all imaging is completed.\par \par We discussed risks, benefits and alternatives. Rationale of care was reviewed and all questions were answered. Patient (and family) had all questions answered to her degree of the level of satisfaction. Patient (and family) expressed understanding and interest in proceeding with the plan as outlined.\par \par \par \par \par This note was done with a voice recognition transcription software and any typos are related to this rather than medical error. Surgical risks reviewed. Patient (and family) had all questions answered to an agreeable level of satisfaction. Patient (and family) expressed understanding and interest in proceeding with the plan as outlined.  \par

## 2022-07-11 NOTE — PHYSICAL EXAM
[General Appearance - Well-Appearing] : Well appearing [FreeTextEntry1] : On exam the patient stands in good balance.  She has no tenderness over her spine.  She has minimal tenderness over the trochanter and nothing about the medial or lateral joint line of her knee.  She has some crepitus with patellofemoral grind with some pain.  She stable to varus and valgus testing.  She has good rotation at her hip as well as flexion and abduction.  She is not tight.  She has excellent strength in quads hamstrings and foot and ankle with no numbness. [General Appearance - Well Nourished] : well nourished [Oriented To Time, Place, And Person] : Oriented to person, place, and time [Impaired Insight] : Insight and judgment were intact [Affect] : The affect was normal. [Mood] : the mood was normal [Sclera] : the sclera and conjunctiva were normal [Neck Cervical Mass (___cm)] : no neck mass was observed [Heart Rate And Rhythm] : heart rate was normal and rhythm regular [] : No respiratory distress [Abdomen Soft] : Soft [Normal Station and Gait] : gait and station were normal [Tenderness] : tenderness [Swelling] : no swelling [Masses] : no masses [Skin Changes - Describe changes:] : No skin changes noted [Full ROM Unless otherwise noted:] : Full range of motion unless otherwise noted: [LE  Motor Strength Normal unless otherwise noted:] : 5/5 strength in bilateral lower extemities unless otherwise noted. [Normal] : normal 2+ DP/PT

## 2022-07-11 NOTE — HISTORY OF PRESENT ILLNESS
[FreeTextEntry1] : Is a 72-year-old female who is very active was done marathons and runs a couple miles a day who has been having left hip and left knee pain.  She saw the arthroplasty surgeon and was given a trochanteric injection in February.  This gave her minimal relief for a few days.  She still has pain radiating from the left side laterally coming down the anterior lateral portion of the leg to just above the knee.  She has bilateral knee crepitus which she states has been there for a while. [Stable] : stable [5] : currently ~his/her~ pain is 5 out of 10 [NSAIDs] : relieved by nonsteroidal anti-inflammatory drugs [de-identified] : Stairs

## 2022-07-11 NOTE — REVIEW OF SYSTEMS
[Feeling Tired] : not feeling tired [Joint Pain] : joint pain [Joint Stiffness] : no joint stiffness [Joint Swelling] : no joint swelling

## 2022-07-11 NOTE — DATA REVIEWED
[Imaging Present] : Present [de-identified] : X-rays today AP pelvis and views of bilateral hips show no obvious bony abnormalities.  There is minimal joint degeneration with some loss of joint space minimally.  There is no osteophyte formation.  Multiple views of the left knee shows some medial joint space narrowing as well as squaring of the condyles with some new bone formation.  There is also patellofemoral arthritis.  There is evidence of chondrocalcinosis.\par \par Multiple views of the lumbar spine today show a short segment scoliosis with significant lateral arthrosis and olisthesis.

## 2022-07-11 NOTE — REASON FOR VISIT
[FreeTextEntry1] : Left hip and knee pain, sent over by her oncologist with history of breast cancer

## 2022-09-14 ENCOUNTER — APPOINTMENT (OUTPATIENT)
Dept: ORTHOPEDIC SURGERY | Facility: CLINIC | Age: 72
End: 2022-09-14

## 2022-09-14 VITALS
HEIGHT: 66 IN | SYSTOLIC BLOOD PRESSURE: 145 MMHG | WEIGHT: 144 LBS | HEART RATE: 75 BPM | BODY MASS INDEX: 23.14 KG/M2 | DIASTOLIC BLOOD PRESSURE: 86 MMHG

## 2022-09-14 DIAGNOSIS — M41.9 SCOLIOSIS, UNSPECIFIED: ICD-10-CM

## 2022-09-14 PROCEDURE — 99214 OFFICE O/P EST MOD 30 MIN: CPT

## 2022-09-14 NOTE — PHYSICAL EXAM
[Antalgic] : not antalgic [Ataxic] : not ataxic [de-identified] : Examination of the lumbar spine reveals no midline tenderness palpation, step-offs, or skin lesions. Decreased range of motion with respect to flexion, extension, lateral bending, and rotation. No tenderness to palpation of the sciatic notch. No tenderness palpation of the bilateral greater trochanters. No pain with passive internal/external rotation of the hips. No instability of bilateral lower extremities.  Negative JOEL. Negative straight leg raise bilaterally. No bowstring. Negative femoral stretch. 5 out of 5 iliopsoas, hip abductors, hips adductors, quadriceps, hamstrings, gastrocsoleus, tibialis anterior, extensor hallucis longus, peroneals. Grossly intact sensation to light touch bilateral lower extremities. 1+ patellar and Achilles reflexes. Downgoing Babinski. No clonus. Intact proprioception. Palpable pulses. No skin lesion and no edema on the right and left lower extremities. [de-identified] : Review of her AP lateral lumbar x-rays reveals spondylosis with fractional degenerative scoliosis.

## 2022-09-14 NOTE — HISTORY OF PRESENT ILLNESS
[de-identified] : Ms. LACY MERINO  is a 72 year old female who presents with 4 months of low back and left lateral thigh pain that is not improving.  She did physical therapy in June and has been doing a home exercise program since then with temporary relief. Nsaids provide temporary relief.   Normal bowel and bladder control.   Denies any recent fevers, chills, sweats, weight loss, or infection.\par \par The patients past medical history, past surgical history, medications, allergies, and social history were reviewed by me today with the patient and documented accordingly.  In addition, the patient's family history, which is noncontributory to their visit, was also reviewed.\par

## 2022-09-14 NOTE — DISCUSSION/SUMMARY
[de-identified] : We discussed further treatment options.  Given her persistent radicular complaints that failed to improve with physical therapy we will obtain a lumbar spine MRI.  Follow-up afterwards.  We briefly discussed the role of possible injection based therapies.

## 2022-09-14 NOTE — DISCUSSION/SUMMARY
[de-identified] : We discussed further treatment options.  Given her persistent radicular complaints that failed to improve with physical therapy we will obtain a lumbar spine MRI.  Follow-up afterwards.  We briefly discussed the role of possible injection based therapies.

## 2022-09-14 NOTE — HISTORY OF PRESENT ILLNESS
[de-identified] : Ms. LACY MERINO  is a 72 year old female who presents with 4 months of low back and left lateral thigh pain that is not improving.  She did physical therapy in June and has been doing a home exercise program since then with temporary relief. Nsaids provide temporary relief.   Normal bowel and bladder control.   Denies any recent fevers, chills, sweats, weight loss, or infection.\par \par The patients past medical history, past surgical history, medications, allergies, and social history were reviewed by me today with the patient and documented accordingly.  In addition, the patient's family history, which is noncontributory to their visit, was also reviewed.\par

## 2022-09-14 NOTE — PHYSICAL EXAM
[Antalgic] : not antalgic [Ataxic] : not ataxic [de-identified] : Examination of the lumbar spine reveals no midline tenderness palpation, step-offs, or skin lesions. Decreased range of motion with respect to flexion, extension, lateral bending, and rotation. No tenderness to palpation of the sciatic notch. No tenderness palpation of the bilateral greater trochanters. No pain with passive internal/external rotation of the hips. No instability of bilateral lower extremities.  Negative JOEL. Negative straight leg raise bilaterally. No bowstring. Negative femoral stretch. 5 out of 5 iliopsoas, hip abductors, hips adductors, quadriceps, hamstrings, gastrocsoleus, tibialis anterior, extensor hallucis longus, peroneals. Grossly intact sensation to light touch bilateral lower extremities. 1+ patellar and Achilles reflexes. Downgoing Babinski. No clonus. Intact proprioception. Palpable pulses. No skin lesion and no edema on the right and left lower extremities. [de-identified] : Review of her AP lateral lumbar x-rays reveals spondylosis with fractional degenerative scoliosis.

## 2022-10-08 ENCOUNTER — APPOINTMENT (OUTPATIENT)
Dept: MRI IMAGING | Facility: CLINIC | Age: 72
End: 2022-10-08

## 2022-10-08 ENCOUNTER — OUTPATIENT (OUTPATIENT)
Dept: OUTPATIENT SERVICES | Facility: HOSPITAL | Age: 72
LOS: 1 days | End: 2022-10-08
Payer: COMMERCIAL

## 2022-10-08 DIAGNOSIS — Z00.00 ENCOUNTER FOR GENERAL ADULT MEDICAL EXAMINATION WITHOUT ABNORMAL FINDINGS: ICD-10-CM

## 2022-10-08 DIAGNOSIS — Z00.8 ENCOUNTER FOR OTHER GENERAL EXAMINATION: ICD-10-CM

## 2022-10-08 PROCEDURE — 72148 MRI LUMBAR SPINE W/O DYE: CPT

## 2022-10-08 PROCEDURE — 72148 MRI LUMBAR SPINE W/O DYE: CPT | Mod: 26

## 2022-10-18 ENCOUNTER — NON-APPOINTMENT (OUTPATIENT)
Age: 72
End: 2022-10-18

## 2022-10-19 ENCOUNTER — APPOINTMENT (OUTPATIENT)
Dept: ORTHOPEDIC SURGERY | Facility: CLINIC | Age: 72
End: 2022-10-19

## 2022-10-19 VITALS — BODY MASS INDEX: 23.14 KG/M2 | WEIGHT: 144 LBS | HEIGHT: 66 IN

## 2022-10-19 DIAGNOSIS — M43.16 SPONDYLOLISTHESIS, LUMBAR REGION: ICD-10-CM

## 2022-10-19 DIAGNOSIS — M54.16 RADICULOPATHY, LUMBAR REGION: ICD-10-CM

## 2022-10-19 DIAGNOSIS — M48.07 SPINAL STENOSIS, LUMBOSACRAL REGION: ICD-10-CM

## 2022-10-19 PROCEDURE — 99214 OFFICE O/P EST MOD 30 MIN: CPT

## 2022-10-19 NOTE — HISTORY OF PRESENT ILLNESS
[de-identified] : Ms. LACY MERINO  is a 72 year old female who presents to the office for a follow-up visit.  She is here to review her MRI results.   She continues to have right lumbar pain and left lateral hip/thigh pain.  Stretching does help.  Stairs are the most difficult. \par

## 2022-10-19 NOTE — PHYSICAL EXAM
[Antalgic] : not antalgic [Ataxic] : not ataxic [de-identified] : Examination of the lumbar spine reveals no midline tenderness palpation, step-offs, or skin lesions. Decreased range of motion with respect to flexion, extension, lateral bending, and rotation. No tenderness to palpation of the sciatic notch. No tenderness palpation of the bilateral greater trochanters. No pain with passive internal/external rotation of the hips. No instability of bilateral lower extremities.  Negative JOEL. Negative straight leg raise bilaterally. No bowstring. Negative femoral stretch. 5 out of 5 iliopsoas, hip abductors, hips adductors, quadriceps, hamstrings, gastrocsoleus, tibialis anterior, extensor hallucis longus, peroneals. Grossly intact sensation to light touch bilateral lower extremities. 1+ patellar and Achilles reflexes. Downgoing Babinski. No clonus. Intact proprioception. Palpable pulses. No skin lesion and no edema on the right and left lower extremities. [de-identified] : Review of her AP lateral lumbar x-rays reveals spondylosis with fractional degenerative scoliosis.\par \par Review of her lumbar spine MRI reveals multilevel degenerative changes with some areas of spondylolisthesis.  Increase stenosis at L3-4 and L4-5

## 2022-10-19 NOTE — DISCUSSION/SUMMARY
[de-identified] : We reviewed her imaging.  We discussed further treatment options both nonsurgical and surgical.  This time she does not feel that her symptoms are limiting her that much.  She wishes to continue conservative measures.  She will undergo a course of physical therapy.  She will let me know of any changes or worsening of her symptoms.

## 2023-01-21 ENCOUNTER — OUTPATIENT (OUTPATIENT)
Dept: OUTPATIENT SERVICES | Facility: HOSPITAL | Age: 73
LOS: 1 days | End: 2023-01-21
Payer: COMMERCIAL

## 2023-01-21 ENCOUNTER — APPOINTMENT (OUTPATIENT)
Age: 73
End: 2023-01-21
Payer: COMMERCIAL

## 2023-01-21 DIAGNOSIS — Z00.8 ENCOUNTER FOR OTHER GENERAL EXAMINATION: ICD-10-CM

## 2023-01-21 PROCEDURE — A9585: CPT

## 2023-01-21 PROCEDURE — 77049 MRI BREAST C-+ W/CAD BI: CPT | Mod: 26

## 2023-01-21 PROCEDURE — C8937: CPT

## 2023-01-21 PROCEDURE — C8908: CPT

## 2023-04-20 ENCOUNTER — OUTPATIENT (OUTPATIENT)
Dept: OUTPATIENT SERVICES | Facility: HOSPITAL | Age: 73
LOS: 1 days | Discharge: HOME | End: 2023-04-20

## 2023-04-20 DIAGNOSIS — C50.919 MALIGNANT NEOPLASM OF UNSPECIFIED SITE OF UNSPECIFIED FEMALE BREAST: ICD-10-CM

## 2023-04-24 ENCOUNTER — RESULT REVIEW (OUTPATIENT)
Age: 73
End: 2023-04-24

## 2023-04-24 ENCOUNTER — APPOINTMENT (OUTPATIENT)
Dept: HEMATOLOGY ONCOLOGY | Facility: CLINIC | Age: 73
End: 2023-04-24
Payer: COMMERCIAL

## 2023-04-24 VITALS
DIASTOLIC BLOOD PRESSURE: 79 MMHG | OXYGEN SATURATION: 97 % | BODY MASS INDEX: 23.42 KG/M2 | HEIGHT: 65.98 IN | WEIGHT: 145.7 LBS | HEART RATE: 70 BPM | TEMPERATURE: 97.6 F | SYSTOLIC BLOOD PRESSURE: 126 MMHG | RESPIRATION RATE: 16 BRPM

## 2023-04-24 LAB
ALBUMIN SERPL ELPH-MCNC: 4.3 G/DL
ALP BLD-CCNC: 78 U/L
ALT SERPL-CCNC: 18 U/L
ANION GAP SERPL CALC-SCNC: 10 MMOL/L
AST SERPL-CCNC: 21 U/L
BASOPHILS # BLD AUTO: 0.06 K/UL — SIGNIFICANT CHANGE UP (ref 0–0.2)
BASOPHILS NFR BLD AUTO: 1.3 % — SIGNIFICANT CHANGE UP (ref 0–2)
BILIRUB SERPL-MCNC: 0.4 MG/DL
BUN SERPL-MCNC: 17 MG/DL
CALCIUM SERPL-MCNC: 10.1 MG/DL
CHLORIDE SERPL-SCNC: 103 MMOL/L
CO2 SERPL-SCNC: 27 MMOL/L
CREAT SERPL-MCNC: 0.85 MG/DL
EGFR: 72 ML/MIN/1.73M2
EOSINOPHIL # BLD AUTO: 0.06 K/UL — SIGNIFICANT CHANGE UP (ref 0–0.5)
EOSINOPHIL NFR BLD AUTO: 1.3 % — SIGNIFICANT CHANGE UP (ref 0–6)
GLUCOSE SERPL-MCNC: 104 MG/DL
HCT VFR BLD CALC: 42.9 % — SIGNIFICANT CHANGE UP (ref 34.5–45)
HGB BLD-MCNC: 14 G/DL — SIGNIFICANT CHANGE UP (ref 11.5–15.5)
IMM GRANULOCYTES NFR BLD AUTO: 0.2 % — SIGNIFICANT CHANGE UP (ref 0–0.9)
LYMPHOCYTES # BLD AUTO: 1.41 K/UL — SIGNIFICANT CHANGE UP (ref 1–3.3)
LYMPHOCYTES # BLD AUTO: 31 % — SIGNIFICANT CHANGE UP (ref 13–44)
MCHC RBC-ENTMCNC: 31.3 PG — SIGNIFICANT CHANGE UP (ref 27–34)
MCHC RBC-ENTMCNC: 32.6 G/DL — SIGNIFICANT CHANGE UP (ref 32–36)
MCV RBC AUTO: 96 FL — SIGNIFICANT CHANGE UP (ref 80–100)
MONOCYTES # BLD AUTO: 0.41 K/UL — SIGNIFICANT CHANGE UP (ref 0–0.9)
MONOCYTES NFR BLD AUTO: 9 % — SIGNIFICANT CHANGE UP (ref 2–14)
NEUTROPHILS # BLD AUTO: 2.6 K/UL — SIGNIFICANT CHANGE UP (ref 1.8–7.4)
NEUTROPHILS NFR BLD AUTO: 57.2 % — SIGNIFICANT CHANGE UP (ref 43–77)
NRBC # BLD: 0 /100 WBCS — SIGNIFICANT CHANGE UP (ref 0–0)
PLATELET # BLD AUTO: 202 K/UL — SIGNIFICANT CHANGE UP (ref 150–400)
POTASSIUM SERPL-SCNC: 4.6 MMOL/L
PROT SERPL-MCNC: 6.8 G/DL
RBC # BLD: 4.47 M/UL — SIGNIFICANT CHANGE UP (ref 3.8–5.2)
RBC # FLD: 12.8 % — SIGNIFICANT CHANGE UP (ref 10.3–14.5)
SODIUM SERPL-SCNC: 139 MMOL/L
WBC # BLD: 4.55 K/UL — SIGNIFICANT CHANGE UP (ref 3.8–10.5)
WBC # FLD AUTO: 4.55 K/UL — SIGNIFICANT CHANGE UP (ref 3.8–10.5)

## 2023-04-24 PROCEDURE — 99214 OFFICE O/P EST MOD 30 MIN: CPT

## 2023-04-24 NOTE — REASON FOR VISIT
[Follow-Up Visit] : a follow-up [FreeTextEntry2] :  Right breast cancer; transferring care from Dr. Azar

## 2023-04-24 NOTE — REVIEW OF SYSTEMS
[Negative] : Allergic/Immunologic [FreeTextEntry8] : hematuria after fall on left side [de-identified] : Had whole body skin check 10/2020. Had biopsy of lesion of leg, was told she had precancerous lesion, had re-excision with plastic surgeon. [de-identified] : Insomnia and anxiety controlled with lorazepam.

## 2023-04-24 NOTE — PHYSICAL EXAM
[Fully active, able to carry on all pre-disease performance without restriction] : Status 0 - Fully active, able to carry on all pre-disease performance without restriction [Normal] : affect appropriate [de-identified] : Bilateral mastectomies with silicone implants, no chest wall mass, no axillary LAD b/l [de-identified] : no cervical supraclav or axillary LAD [de-identified] : Vitiligo;

## 2023-04-24 NOTE — HISTORY OF PRESENT ILLNESS
[Disease: _____________________] : Disease: [unfilled] [T: ___] : T[unfilled] [N: ___] : N[unfilled] [M: ___] : M[unfilled] [AJCC Stage: ____] : AJCC Stage: [unfilled] [Treatment Protocol] : Treatment Protocol [ECOG Performance Status: 0 - Fully active, able to carry on all pre-disease performance without restriction] : Performance Status: 0 - Fully active, able to carry on all pre-disease performance without restriction [de-identified] : The patient presented in 2009, at age 58, with the mass she discovered on BSE.\par \par Ultimately Dr. Ignacia Sidhu performed a right mastectomy and right axillary lymph node dissection followed by placement of a saline expander by Dr. Jozef Saini. There were  2 foci of infiltrating ductal carcinoma 4.5 cm apart from each other measuring 1.4 and 0.7 cm respectively. Both tumors were ER positive, [place] positive,  and HER-2/syd negative. 1/8 lymph nodes was positive for metastatic disease.\par \par Postoperatively the patient received 4 cycles of TC chemotherapy extending from June 22, 2009 through August 24, 2009. \par \par In September 2009 she started anastrozole completed in 1/2020.\par \par In 2011 the patient underwent genetic testing and was found to have a deleterious mutation of BRCA1 (5385 ins C). \par \par On October 7, 2011 the patient underwent risk reduction BSO and left simple mastectomy / sentinel node biopsy. Pathology from all  specimens was benign. [de-identified] : Multifocal well differentiated  IDC ER+CA+Jae7fph- [de-identified] : BRCA1 mutation [FreeTextEntry1] : Arimidex start 09/22/2009 - 1/2020 [de-identified] : 4/24/23\par Transferring care from Dr. Azar to me\par All of the patient's prior records including radiology, pathology and prior notes reviewed; Past Medical History, Past Surgical History, Family History and Social history reviewed and updated in the patient's chart.\par \par Patient remains on active surveillance.\par After injury at work in 4/2023 when she fell and injured her hip, she noted some hematuria; referred to urology and awaiting appointment in 5/2023\par Breast US 9/2021 had  left axillary LN biopsy which was benign. Left arm axillary adenopathy - persistent likely residual from COVID booster 10/28/21\par chronic low back pain and neck stiffness and bilateral neuropathy of the feet.  Rheum workup negative; ortho workup neg xrays - demonstrate degenerative changes; s/p left hip cortisone injection; difficult to exercise and run with hip pain\par Follows with breast surgeon Dr. Ignacia Sidhu every 6 months\par Breast MRI (in PACS) 1/2023 with [facility]\par Patient contacted plastic surgeon, her implants were not recalled.\par MRI reconstructed breasts 10/3/2020 stable silicone implants stable, BI RADS 2.\par \par HEALTH MAINTENANCE\par PCP/CARDS Dr. Rodriguez last seen 4/2023 urgently due to hematuria\par OPHTHO b/l cataracts Dr. Cuevas 7/6/21; given restasis? eye drops\par Last GYN oncology exam 10/2021 with Dr Lemus.  No vaginal bleeding or spotting.\par GI Dr. Girish Holguin colonoscopy 7/30/21 - 3mm sessile polyp, diverticulosis; CT A/P tiny 2mm calcification in the body of pancreas, may be related to minimal chronic hepatitis, no ductal dilatation or inflammatory changes, renal calculus, no acute abdominal findings.\par Endocrinologist Dr Acharya 11/4/2019. Was told to DC ibandronate when she completed anastrozole. Patient DC'd ibandronate in 1/2020.\par ORTHO 11/2021 intermittent, seeing for b/l knee, hip pain (is the worst) and back pain; Xray of right hip showed arthritis; maybe pinched nerve, improved with PT.  \par RHEUM saw generalized complaints - dry eye, stiff neck, dry mouth, palpitations, lightheaded; w/u neg for autoimmune dx\par h/o mechanical falls, runner; has to be cautious when walking to avoid falls, no dizziness, headaches, blurry vision, no syncope; \par saw PCP/ophtho/ ENT Dr. 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 Yoli Acharya increased bone density with Osteopenia noted on BMD on 9/2/2021, continue off tx, repeat DXA in 2 years or as per Dr. Acharya\par NEURO brain unremarkable 9/1/21 for lightheadedness/vertigo. 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

## 2023-04-24 NOTE — ASSESSMENT
[FreeTextEntry1] : Multifocal well differentiated infiltrating ductal carcinoma in 2009\par     [id number], ER+ AR+ Her2 syd -\par      Stage IB (AJCC 8 edition)\par     Status post mastectomy/ALND.\par     Status post TC x 4.\par     Completed anastrozole 10 yrs in 1/2020, now on active surveillance; patient prefers to be seen every 6 months\par     Clinically [facility]\par     labs today including chem panel\par \par Deleterious mutation BRCA1\par     s/p bilateral mastectomies after diagnosis of right breast cancer\par     s/p risk reduction BSO\par     also being followed by Dr Lemus in GYN oncology. \par     daughter has tested positive; interested in following with [facility]; information and contact         provided\par     patient's son and grandson have not been tested; encouraged to have them call to discuss options with        genetics\par     Following with Dr. Ignacia Sidhu; breast surgery as scheduled; \par \par Hematuria\par     to follow up with Urology as scheduled in 5/2023\par \par \par     Patient had the opportunity to have all their questions answered to their satisfaction \par     FU in 6-12 months [FreeTextEntry2] : \par

## 2023-04-26 ENCOUNTER — NON-APPOINTMENT (OUTPATIENT)
Age: 73
End: 2023-04-26

## 2023-06-13 ENCOUNTER — APPOINTMENT (OUTPATIENT)
Dept: ENDOCRINOLOGY | Facility: CLINIC | Age: 73
End: 2023-06-13
Payer: COMMERCIAL

## 2023-06-13 VITALS
BODY MASS INDEX: 23.3 KG/M2 | OXYGEN SATURATION: 98 % | SYSTOLIC BLOOD PRESSURE: 110 MMHG | WEIGHT: 145 LBS | HEART RATE: 76 BPM | HEIGHT: 65.98 IN | DIASTOLIC BLOOD PRESSURE: 68 MMHG

## 2023-06-13 DIAGNOSIS — M85.89 OTHER SPECIFIED DISORDERS OF BONE DENSITY AND STRUCTURE, MULTIPLE SITES: ICD-10-CM

## 2023-06-13 PROCEDURE — 99213 OFFICE O/P EST LOW 20 MIN: CPT

## 2023-06-13 NOTE — HISTORY OF PRESENT ILLNESS
[FreeTextEntry1] : cc: osteopenia\par \par 71 year old woman with osteopenia, previously on aromatase inhibitor, treated with bisphosphanate tx x ~ 5 years after a rapid drop in bone density, was off tx for one year with decrease in bone density at all sites.  Resumed Boniva for about a year, then stopped aromatase inhibitor tx and boniva was discontinued.  Stopped both in February 2020 .  She consumes 1 serving dairy daily   takes supplemental calcium with vitamin D.  .  Has not noted any change in height, reports no recent fractures.  Fell at work ( was pushed accidentally, fell on cement floor, has large bruise, no fracture) Also noted blood in urine afterward and is undergoing further evaluation, though told probably not related to the fall.   Has been running  a little, also walks, occasional weights.  Is active at work.  \par \par   \par \par \par

## 2023-06-13 NOTE — ASSESSMENT
[FreeTextEntry1] : 72 year old woman previously on anastrozole for breast cancer, with osteopenia treated with bisphosphonate tx x 5 years with improved bone density, with decrease after one year "drug holiday" then back on tx for ~one year.  Stopped anastrozole and bisphosphonate 2020\par   - DXA performed last visit, pt with osteopenia, slight decline in bone density.  Continue to monitor off tx\par  - Repeat DXA 9/2023, consider resuming tx\par  - continue calcium 500 mg daily and weight bearing exercise\par \par \par \par f/u ~1 year

## 2023-09-14 ENCOUNTER — APPOINTMENT (OUTPATIENT)
Dept: RHEUMATOLOGY | Facility: CLINIC | Age: 73
End: 2023-09-14
Payer: MEDICARE

## 2023-09-14 VITALS
HEIGHT: 65.98 IN | SYSTOLIC BLOOD PRESSURE: 130 MMHG | BODY MASS INDEX: 22.5 KG/M2 | OXYGEN SATURATION: 97 % | DIASTOLIC BLOOD PRESSURE: 80 MMHG | WEIGHT: 140 LBS

## 2023-09-14 DIAGNOSIS — M79.7 FIBROMYALGIA: ICD-10-CM

## 2023-09-14 PROCEDURE — 99215 OFFICE O/P EST HI 40 MIN: CPT

## 2023-09-27 ENCOUNTER — APPOINTMENT (OUTPATIENT)
Dept: GYNECOLOGIC ONCOLOGY | Facility: CLINIC | Age: 73
End: 2023-09-27
Payer: MEDICARE

## 2023-09-27 VITALS
SYSTOLIC BLOOD PRESSURE: 136 MMHG | OXYGEN SATURATION: 96 % | HEART RATE: 69 BPM | BODY MASS INDEX: 23.3 KG/M2 | DIASTOLIC BLOOD PRESSURE: 74 MMHG | WEIGHT: 145 LBS | HEIGHT: 66 IN

## 2023-09-27 PROCEDURE — 99213 OFFICE O/P EST LOW 20 MIN: CPT

## 2023-09-29 RX ORDER — LORAZEPAM 0.5 MG/1
0.5 TABLET ORAL
Qty: 60 | Refills: 0 | Status: DISCONTINUED | COMMUNITY
Start: 2018-09-14 | End: 2023-09-29

## 2023-09-29 RX ORDER — SIMVASTATIN 10 MG/1
10 TABLET, FILM COATED ORAL
Qty: 30 | Refills: 0 | Status: DISCONTINUED | COMMUNITY
Start: 2017-10-30 | End: 2023-09-29

## 2023-09-29 RX ORDER — DULOXETINE HYDROCHLORIDE 60 MG/1
60 CAPSULE, DELAYED RELEASE ORAL
Refills: 0 | Status: ACTIVE | COMMUNITY

## 2023-11-07 ENCOUNTER — OUTPATIENT (OUTPATIENT)
Dept: OUTPATIENT SERVICES | Facility: HOSPITAL | Age: 73
LOS: 1 days | Discharge: HOME | End: 2023-11-07

## 2023-11-07 DIAGNOSIS — C50.919 MALIGNANT NEOPLASM OF UNSPECIFIED SITE OF UNSPECIFIED FEMALE BREAST: ICD-10-CM

## 2023-11-09 ENCOUNTER — APPOINTMENT (OUTPATIENT)
Dept: ENDOCRINOLOGY | Facility: CLINIC | Age: 73
End: 2023-11-09
Payer: MEDICARE

## 2023-11-09 VITALS — WEIGHT: 141 LBS | HEIGHT: 64.5 IN | BODY MASS INDEX: 23.78 KG/M2

## 2023-11-09 PROCEDURE — 77080 DXA BONE DENSITY AXIAL: CPT | Mod: GA

## 2023-11-20 ENCOUNTER — APPOINTMENT (OUTPATIENT)
Dept: HEMATOLOGY ONCOLOGY | Facility: CLINIC | Age: 73
End: 2023-11-20
Payer: MEDICARE

## 2023-11-20 VITALS
BODY MASS INDEX: 23.84 KG/M2 | HEART RATE: 69 BPM | OXYGEN SATURATION: 98 % | DIASTOLIC BLOOD PRESSURE: 76 MMHG | RESPIRATION RATE: 16 BRPM | WEIGHT: 141.1 LBS | TEMPERATURE: 97.3 F | SYSTOLIC BLOOD PRESSURE: 118 MMHG

## 2023-11-20 DIAGNOSIS — C50.911 MALIGNANT NEOPLASM OF UNSPECIFIED SITE OF RIGHT FEMALE BREAST: ICD-10-CM

## 2023-11-20 PROCEDURE — 99214 OFFICE O/P EST MOD 30 MIN: CPT

## 2024-04-22 ENCOUNTER — APPOINTMENT (OUTPATIENT)
Dept: PODIATRY | Facility: CLINIC | Age: 74
End: 2024-04-22
Payer: MEDICARE

## 2024-04-22 DIAGNOSIS — M21.40 FLAT FOOT [PES PLANUS] (ACQUIRED), UNSPECIFIED FOOT: ICD-10-CM

## 2024-04-22 DIAGNOSIS — M20.40 OTHER HAMMER TOE(S) (ACQUIRED), UNSPECIFIED FOOT: ICD-10-CM

## 2024-04-22 DIAGNOSIS — G62.9 POLYNEUROPATHY, UNSPECIFIED: ICD-10-CM

## 2024-04-22 DIAGNOSIS — N20.0 CALCULUS OF KIDNEY: ICD-10-CM

## 2024-04-22 DIAGNOSIS — M72.2 PLANTAR FASCIAL FIBROMATOSIS: ICD-10-CM

## 2024-04-22 PROCEDURE — 99203 OFFICE O/P NEW LOW 30 MIN: CPT

## 2024-04-22 RX ORDER — VALACYCLOVIR HYDROCHLORIDE 1 G/1
TABLET, FILM COATED ORAL
Refills: 0 | Status: ACTIVE | COMMUNITY

## 2024-04-22 RX ORDER — DULOXETINE HYDROCHLORIDE 20 MG/1
20 CAPSULE, DELAYED RELEASE PELLETS ORAL
Refills: 0 | Status: ACTIVE | COMMUNITY

## 2024-04-24 PROBLEM — M20.40 HAMMERTOE: Status: ACTIVE | Noted: 2024-04-23

## 2024-04-24 PROBLEM — G62.9 NEUROPATHY: Status: ACTIVE | Noted: 2024-04-23

## 2024-04-24 PROBLEM — M72.2 PLANTAR FASCIITIS: Status: ACTIVE | Noted: 2024-04-23

## 2024-04-24 PROBLEM — M21.40 FLATFOOT: Status: ACTIVE | Noted: 2024-04-23

## 2024-04-24 NOTE — ASSESSMENT
[FreeTextEntry1] : Impression: Flatfoot. Plantar fasciitis. Neuropathy. Hammertoe.  Treatment: I enucleated the keratotic lesion. I think the orthotics are still functional. They are not warped. They just need to be refurbished and recovered. I discussed this with the patient. I gave her a series of exercises and home therapy exercises that will be beneficial. I think she should wear the orthotics. Follow-up with continued pain that persists.

## 2024-04-24 NOTE — PHYSICAL EXAM
[2+] : left foot dorsalis pedis 2+ [Vibration Dec.] : diminished vibratory sensation at the level of the toes [Diminished Throughout Right Foot] : diminished sensation with monofilament testing throughout right foot [Diminished Throughout Left Foot] : diminished sensation with monofilament testing throughout left foot [Ankle Swelling (On Exam)] : not present [Varicose Veins Of Lower Extremities] : not present [] : not present [Delayed in the Right Toes] : capillary refills normal in right toes [Delayed in the Left Toes] : capillary refills normal in the left toes [FreeTextEntry3] : Hair growth noted on digits. Proximal to distal cooling is within normal limits.  [de-identified] : Fully compensated forefoot varus with flexible flatfoot deformity. 2nd hammertoe. Very minor fasciitis with flatfoot. Some arthrosis about the right 1st MPJ that is asymptomatic. [FreeTextEntry1] : Right 2nd toe DIPJ keratosis.

## 2024-04-24 NOTE — HISTORY OF PRESENT ILLNESS
[FreeTextEntry1] : Patient presents today for multiple issues. She has a history of flatfoot and plantar fasciitis. She had a history of tear. She wears orthotics and presents with 2 pairs, but she feels that they are older. she has generalized arch sensitivity and a right 2nd hammertoe.  She has a history of breast cancer and chemo.

## 2024-05-04 ENCOUNTER — OUTPATIENT (OUTPATIENT)
Dept: OUTPATIENT SERVICES | Facility: HOSPITAL | Age: 74
LOS: 1 days | End: 2024-05-04
Payer: MEDICARE

## 2024-05-04 ENCOUNTER — APPOINTMENT (OUTPATIENT)
Dept: ULTRASOUND IMAGING | Facility: IMAGING CENTER | Age: 74
End: 2024-05-04
Payer: MEDICARE

## 2024-05-04 DIAGNOSIS — Z00.8 ENCOUNTER FOR OTHER GENERAL EXAMINATION: ICD-10-CM

## 2024-05-04 PROCEDURE — 76641 ULTRASOUND BREAST COMPLETE: CPT | Mod: 26,50,GA

## 2024-05-04 PROCEDURE — 76641 ULTRASOUND BREAST COMPLETE: CPT

## 2024-05-13 ENCOUNTER — OUTPATIENT (OUTPATIENT)
Dept: OUTPATIENT SERVICES | Facility: HOSPITAL | Age: 74
LOS: 1 days | Discharge: HOME | End: 2024-05-13

## 2024-05-13 DIAGNOSIS — C50.919 MALIGNANT NEOPLASM OF UNSPECIFIED SITE OF UNSPECIFIED FEMALE BREAST: ICD-10-CM

## 2024-05-20 ENCOUNTER — RESULT REVIEW (OUTPATIENT)
Age: 74
End: 2024-05-20

## 2024-05-20 ENCOUNTER — APPOINTMENT (OUTPATIENT)
Dept: HEMATOLOGY ONCOLOGY | Facility: CLINIC | Age: 74
End: 2024-05-20
Payer: MEDICARE

## 2024-05-20 VITALS
HEART RATE: 96 BPM | WEIGHT: 142.84 LBS | TEMPERATURE: 97.4 F | OXYGEN SATURATION: 98 % | SYSTOLIC BLOOD PRESSURE: 105 MMHG | RESPIRATION RATE: 16 BRPM | BODY MASS INDEX: 24.14 KG/M2 | DIASTOLIC BLOOD PRESSURE: 71 MMHG

## 2024-05-20 DIAGNOSIS — Z15.01 GENETIC SUSCEPTIBILITY TO MALIGNANT NEOPLASM OF BREAST: ICD-10-CM

## 2024-05-20 DIAGNOSIS — E78.5 HYPERLIPIDEMIA, UNSPECIFIED: ICD-10-CM

## 2024-05-20 DIAGNOSIS — Z15.09 GENETIC SUSCEPTIBILITY TO MALIGNANT NEOPLASM OF BREAST: ICD-10-CM

## 2024-05-20 LAB
BASOPHILS # BLD AUTO: 0.05 K/UL — SIGNIFICANT CHANGE UP (ref 0–0.2)
BASOPHILS NFR BLD AUTO: 1.2 % — SIGNIFICANT CHANGE UP (ref 0–2)
EOSINOPHIL # BLD AUTO: 0.07 K/UL — SIGNIFICANT CHANGE UP (ref 0–0.5)
EOSINOPHIL NFR BLD AUTO: 1.7 % — SIGNIFICANT CHANGE UP (ref 0–6)
HCT VFR BLD CALC: 45.5 % — HIGH (ref 34.5–45)
HGB BLD-MCNC: 15.2 G/DL — SIGNIFICANT CHANGE UP (ref 11.5–15.5)
IMM GRANULOCYTES NFR BLD AUTO: 0.2 % — SIGNIFICANT CHANGE UP (ref 0–0.9)
LYMPHOCYTES # BLD AUTO: 1.33 K/UL — SIGNIFICANT CHANGE UP (ref 1–3.3)
LYMPHOCYTES # BLD AUTO: 31.7 % — SIGNIFICANT CHANGE UP (ref 13–44)
MCHC RBC-ENTMCNC: 31.6 PG — SIGNIFICANT CHANGE UP (ref 27–34)
MCHC RBC-ENTMCNC: 33.4 G/DL — SIGNIFICANT CHANGE UP (ref 32–36)
MCV RBC AUTO: 94.6 FL — SIGNIFICANT CHANGE UP (ref 80–100)
MONOCYTES # BLD AUTO: 0.37 K/UL — SIGNIFICANT CHANGE UP (ref 0–0.9)
MONOCYTES NFR BLD AUTO: 8.8 % — SIGNIFICANT CHANGE UP (ref 2–14)
NEUTROPHILS # BLD AUTO: 2.36 K/UL — SIGNIFICANT CHANGE UP (ref 1.8–7.4)
NEUTROPHILS NFR BLD AUTO: 56.4 % — SIGNIFICANT CHANGE UP (ref 43–77)
NRBC # BLD: 0 /100 WBCS — SIGNIFICANT CHANGE UP (ref 0–0)
NRBC BLD-RTO: 0 /100 WBCS — SIGNIFICANT CHANGE UP (ref 0–0)
PLATELET # BLD AUTO: 208 K/UL — SIGNIFICANT CHANGE UP (ref 150–400)
RBC # BLD: 4.81 M/UL — SIGNIFICANT CHANGE UP (ref 3.8–5.2)
RBC # FLD: 13 % — SIGNIFICANT CHANGE UP (ref 10.3–14.5)
WBC # BLD: 4.19 K/UL — SIGNIFICANT CHANGE UP (ref 3.8–10.5)
WBC # FLD AUTO: 4.19 K/UL — SIGNIFICANT CHANGE UP (ref 3.8–10.5)

## 2024-05-20 PROCEDURE — 99213 OFFICE O/P EST LOW 20 MIN: CPT

## 2024-05-20 PROCEDURE — G2211 COMPLEX E/M VISIT ADD ON: CPT

## 2024-05-20 NOTE — HISTORY OF PRESENT ILLNESS
[Disease: _____________________] : Disease: [unfilled] [T: ___] : T[unfilled] [N: ___] : N[unfilled] [M: ___] : M[unfilled] [AJCC Stage: ____] : AJCC Stage: [unfilled] [Treatment Protocol] : Treatment Protocol [ECOG Performance Status: 0 - Fully active, able to carry on all pre-disease performance without restriction] : Performance Status: 0 - Fully active, able to carry on all pre-disease performance without restriction [de-identified] : The patient presented in 2009, at age 58, with the mass she discovered on BSE.  Ultimately Dr. Ignacia Sidhu performed a right mastectomy and right axillary lymph node dissection followed by placement of a saline expander by Dr. Jozef Saini. There were  2 foci of infiltrating ductal carcinoma 4.5 cm apart from each other measuring 1.4 and 0.7 cm respectively. Both tumors were ER positive, IA positive,  and HER-2/syd negative. 1/8 lymph nodes was positive for metastatic disease.  Postoperatively the patient received 4 cycles of TC chemotherapy extending from June 22, 2009 through August 24, 2009.   In September 2009 she started anastrozole completed in 1/2020.  In 2011 the patient underwent genetic testing and was found to have a deleterious mutation of BRCA1 (5385 ins C).   On October 7, 2011 the patient underwent risk reduction BSO and left simple mastectomy / sentinel node biopsy. Pathology from all  specimens was benign. [de-identified] : Multifocal well differentiated  IDC ER+OH+[id number]- [de-identified] : BRCA1 mutation [FreeTextEntry1] : Arimidex start 09/22/2009 - 1/2020 [de-identified] : 5/20/2024 Patient returns today to rule out progression or recurrence of breast cancer Patient remains on active surveillance. BRCA mutation She  denies any SOB, CP, abdominal pain, bone pain, headache, or unexplained weight loss LACY  denies any breast masses, breast tenderness, skin changes or nipple discharge. No change in family history of cancer; no family hx of pancreatic cancer;  her daughter who is BRCA positive recently had her ovaries removed. Follows with breast surgeon Dr. Ignacia Sidhu every 6 months  [facility] PCP/CARDS Dr. Rodriguez last seen 4/2023 urgently due to hematuria OPHTHO b/l cataracts Dr. Cuevas 7/6/21; given restasis? eye drops Last GYN oncology exam 10/2021 with Dr Lemus.  No vaginal bleeding or spotting. GI Dr. Girish Holguin colonoscopy 7/30/21 - 3mm sessile polyp, diverticulosis; CT A/P tiny 2mm calcification in the body of pancreas, may be related to minimal chronic hepatitis, no ductal dilatation or inflammatory changes, renal calculus, no acute abdominal findings. Endocrinologist Dr Acharya 11/4/2019. Was told to DC ibandronate when she completed anastrozole. Patient DC'd ibandronate in 1/2020. ORTHO 11/2021 intermittent, seeing for b/l knee, hip pain (is the worst) and back pain; Xray of right hip showed arthritis; maybe pinched nerve, improved with PT.   RHEUM saw generalized complaints - dry eye, stiff neck, dry mouth, palpitations, lightheaded; w/u neg for autoimmune dx h/o mechanical falls, runner; has to be cautious when walking to avoid falls, no dizziness, headaches, blurry vision, no syncope;  saw PCP/ophtho/ ENT Dr. June 7/14/21 who dx'd with inner ear d/o and vertigo, also arthritis of neck, improving with PT; instructed to call if symptoms worsen  ENDO Dr. Yoli Acharya increased bone density with Osteopenia noted on BMD on 9/2/2021, continue off tx, repeat DXA in 2 years or as per Dr. Acharya NEURO brain unremarkable 9/1/21 for lightheadedness/vertigo.  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

## 2024-05-20 NOTE — ASSESSMENT
[FreeTextEntry1] : Multifocal well differentiated infiltrating ductal carcinoma in 2009   [id number], ER+ CT+ Her2 syd -  Stage IB (AJCC 8 edition)  Status post mastectomy/ALND.  Status post TC x 4.  Completed anastrozole 10 yrs in 1/2020, now on active surveillance; patient prefers to be seen every 6 months  Clinically [facility]  labs to be done with PMD annual visit next month  Deleterious mutation BRCA1  s/p bilateral mastectomies after diagnosis of right breast cancer  s/p risk reduction BSO  also being followed by Dr Lemus in GYN oncology.  Following with Dr. Ignacia Sidhu (Breast surgeon) as scheduled   Patient had the opportunity to have all their questions answered to their satisfaction  Recommend annual follow-up; patient prefers to come every 6 months

## 2024-05-20 NOTE — REVIEW OF SYSTEMS
[Negative] : Genitourinary [FreeTextEntry9] : Flare of fibromyalgia [de-identified] : s/p Moh's procedure [de-identified] : Insomnia and anxiety controlled with lorazepam.

## 2024-05-21 LAB
ALBUMIN SERPL ELPH-MCNC: 4.7 G/DL
ALP BLD-CCNC: 86 U/L
ALT SERPL-CCNC: 17 U/L
ANION GAP SERPL CALC-SCNC: 12 MMOL/L
AST SERPL-CCNC: 22 U/L
BILIRUB SERPL-MCNC: 0.4 MG/DL
BUN SERPL-MCNC: 15 MG/DL
CALCIUM SERPL-MCNC: 9.9 MG/DL
CHLORIDE SERPL-SCNC: 104 MMOL/L
CO2 SERPL-SCNC: 24 MMOL/L
CREAT SERPL-MCNC: 0.82 MG/DL
EGFR: 75 ML/MIN/1.73M2
GLUCOSE SERPL-MCNC: 105 MG/DL
POTASSIUM SERPL-SCNC: 5.2 MMOL/L
PROT SERPL-MCNC: 7.1 G/DL
SODIUM SERPL-SCNC: 140 MMOL/L

## 2024-05-23 LAB
CHOLEST SERPL-MCNC: 291 MG/DL
HDLC SERPL-MCNC: 62 MG/DL
LDLC SERPL CALC-MCNC: 201 MG/DL
NONHDLC SERPL-MCNC: 229 MG/DL
TRIGL SERPL-MCNC: 154 MG/DL

## 2024-10-09 ENCOUNTER — NON-APPOINTMENT (OUTPATIENT)
Age: 74
End: 2024-10-09

## 2024-10-09 ENCOUNTER — APPOINTMENT (OUTPATIENT)
Dept: GYNECOLOGIC ONCOLOGY | Facility: CLINIC | Age: 74
End: 2024-10-09

## 2024-10-09 PROCEDURE — G2211 COMPLEX E/M VISIT ADD ON: CPT

## 2024-10-09 PROCEDURE — 99214 OFFICE O/P EST MOD 30 MIN: CPT

## 2024-10-09 PROCEDURE — 99459 PELVIC EXAMINATION: CPT

## 2024-10-10 LAB — HPV HIGH+LOW RISK DNA PNL CVX: NOT DETECTED

## 2024-10-14 LAB — CYTOLOGY CVX/VAG DOC THIN PREP: ABNORMAL

## 2024-11-18 ENCOUNTER — OUTPATIENT (OUTPATIENT)
Dept: OUTPATIENT SERVICES | Facility: HOSPITAL | Age: 74
LOS: 1 days | Discharge: HOME | End: 2024-11-18

## 2024-11-18 DIAGNOSIS — C50.919 MALIGNANT NEOPLASM OF UNSPECIFIED SITE OF UNSPECIFIED FEMALE BREAST: ICD-10-CM

## 2025-01-03 ENCOUNTER — APPOINTMENT (OUTPATIENT)
Dept: HEMATOLOGY ONCOLOGY | Facility: CLINIC | Age: 75
End: 2025-01-03
Payer: MEDICARE

## 2025-01-03 ENCOUNTER — NON-APPOINTMENT (OUTPATIENT)
Age: 75
End: 2025-01-03

## 2025-01-03 VITALS
WEIGHT: 150.99 LBS | HEART RATE: 73 BPM | DIASTOLIC BLOOD PRESSURE: 84 MMHG | SYSTOLIC BLOOD PRESSURE: 137 MMHG | TEMPERATURE: 97 F | BODY MASS INDEX: 25.52 KG/M2 | OXYGEN SATURATION: 98 % | RESPIRATION RATE: 16 BRPM

## 2025-01-03 DIAGNOSIS — C50.911 MALIGNANT NEOPLASM OF UNSPECIFIED SITE OF RIGHT FEMALE BREAST: ICD-10-CM

## 2025-01-03 DIAGNOSIS — Z79.811 LONG TERM (CURRENT) USE OF AROMATASE INHIBITORS: ICD-10-CM

## 2025-01-03 DIAGNOSIS — Z15.09 GENETIC SUSCEPTIBILITY TO MALIGNANT NEOPLASM OF BREAST: ICD-10-CM

## 2025-01-03 DIAGNOSIS — R92.8 OTHER ABNORMAL AND INCONCLUSIVE FINDINGS ON DIAGNOSTIC IMAGING OF BREAST: ICD-10-CM

## 2025-01-03 DIAGNOSIS — Z15.01 GENETIC SUSCEPTIBILITY TO MALIGNANT NEOPLASM OF BREAST: ICD-10-CM

## 2025-01-03 DIAGNOSIS — R42 DIZZINESS AND GIDDINESS: ICD-10-CM

## 2025-01-03 PROCEDURE — G2211 COMPLEX E/M VISIT ADD ON: CPT

## 2025-01-03 PROCEDURE — 99213 OFFICE O/P EST LOW 20 MIN: CPT

## 2025-04-02 ENCOUNTER — APPOINTMENT (OUTPATIENT)
Age: 75
End: 2025-04-02
Payer: MEDICARE

## 2025-04-02 ENCOUNTER — NON-APPOINTMENT (OUTPATIENT)
Age: 75
End: 2025-04-02

## 2025-04-02 PROCEDURE — 92014 COMPRE OPH EXAM EST PT 1/>: CPT

## 2025-04-30 ENCOUNTER — APPOINTMENT (OUTPATIENT)
Dept: RHEUMATOLOGY | Facility: CLINIC | Age: 75
End: 2025-04-30
Payer: MEDICARE

## 2025-04-30 VITALS
SYSTOLIC BLOOD PRESSURE: 119 MMHG | DIASTOLIC BLOOD PRESSURE: 76 MMHG | RESPIRATION RATE: 16 BRPM | OXYGEN SATURATION: 97 % | HEART RATE: 92 BPM | HEIGHT: 64.5 IN

## 2025-04-30 DIAGNOSIS — M79.7 FIBROMYALGIA: ICD-10-CM

## 2025-04-30 PROCEDURE — 99213 OFFICE O/P EST LOW 20 MIN: CPT

## 2025-04-30 PROCEDURE — G2211 COMPLEX E/M VISIT ADD ON: CPT

## 2025-06-05 ENCOUNTER — APPOINTMENT (OUTPATIENT)
Age: 75
End: 2025-06-05
Payer: MEDICARE

## 2025-06-05 ENCOUNTER — NON-APPOINTMENT (OUTPATIENT)
Age: 75
End: 2025-06-05

## 2025-06-05 PROCEDURE — 66984 XCAPSL CTRC RMVL W/O ECP: CPT | Mod: RT

## 2025-06-05 PROCEDURE — V2787: CPT

## 2025-06-06 ENCOUNTER — APPOINTMENT (OUTPATIENT)
Age: 75
End: 2025-06-06
Payer: MEDICARE

## 2025-06-06 ENCOUNTER — NON-APPOINTMENT (OUTPATIENT)
Age: 75
End: 2025-06-06

## 2025-06-06 PROCEDURE — 99024 POSTOP FOLLOW-UP VISIT: CPT

## 2025-06-11 ENCOUNTER — APPOINTMENT (OUTPATIENT)
Age: 75
End: 2025-06-11
Payer: MEDICARE

## 2025-06-11 ENCOUNTER — NON-APPOINTMENT (OUTPATIENT)
Age: 75
End: 2025-06-11

## 2025-06-11 PROCEDURE — 99024 POSTOP FOLLOW-UP VISIT: CPT

## 2025-07-02 ENCOUNTER — APPOINTMENT (OUTPATIENT)
Age: 75
End: 2025-07-02
Payer: MEDICARE

## 2025-07-02 ENCOUNTER — NON-APPOINTMENT (OUTPATIENT)
Age: 75
End: 2025-07-02

## 2025-07-02 PROCEDURE — 92014 COMPRE OPH EXAM EST PT 1/>: CPT | Mod: 24

## 2025-07-02 PROCEDURE — 92136 OPHTHALMIC BIOMETRY: CPT | Mod: 26,LT

## 2025-07-02 PROCEDURE — 92134 CPTRZ OPH DX IMG PST SGM RTA: CPT

## 2025-07-10 ENCOUNTER — NON-APPOINTMENT (OUTPATIENT)
Age: 75
End: 2025-07-10

## 2025-07-10 ENCOUNTER — APPOINTMENT (OUTPATIENT)
Age: 75
End: 2025-07-10
Payer: MEDICARE

## 2025-07-10 PROCEDURE — V2787: CPT

## 2025-07-10 PROCEDURE — 66984 XCAPSL CTRC RMVL W/O ECP: CPT | Mod: 79,LT

## 2025-07-11 ENCOUNTER — APPOINTMENT (OUTPATIENT)
Age: 75
End: 2025-07-11
Payer: MEDICARE

## 2025-07-11 ENCOUNTER — NON-APPOINTMENT (OUTPATIENT)
Age: 75
End: 2025-07-11

## 2025-07-11 PROCEDURE — 99024 POSTOP FOLLOW-UP VISIT: CPT

## 2025-07-16 ENCOUNTER — OUTPATIENT (OUTPATIENT)
Dept: OUTPATIENT SERVICES | Facility: HOSPITAL | Age: 75
LOS: 1 days | Discharge: HOME | End: 2025-07-16

## 2025-07-16 DIAGNOSIS — C50.919 MALIGNANT NEOPLASM OF UNSPECIFIED SITE OF UNSPECIFIED FEMALE BREAST: ICD-10-CM

## 2025-07-17 ENCOUNTER — APPOINTMENT (OUTPATIENT)
Dept: HEMATOLOGY ONCOLOGY | Facility: CLINIC | Age: 75
End: 2025-07-17
Payer: MEDICARE

## 2025-07-17 VITALS
TEMPERATURE: 97.2 F | RESPIRATION RATE: 16 BRPM | HEART RATE: 75 BPM | WEIGHT: 156.97 LBS | BODY MASS INDEX: 26.53 KG/M2 | DIASTOLIC BLOOD PRESSURE: 66 MMHG | SYSTOLIC BLOOD PRESSURE: 100 MMHG | OXYGEN SATURATION: 99 %

## 2025-07-17 PROCEDURE — 99214 OFFICE O/P EST MOD 30 MIN: CPT

## 2025-07-18 ENCOUNTER — NON-APPOINTMENT (OUTPATIENT)
Age: 75
End: 2025-07-18

## 2025-07-18 ENCOUNTER — APPOINTMENT (OUTPATIENT)
Age: 75
End: 2025-07-18
Payer: MEDICARE

## 2025-07-18 PROCEDURE — 99024 POSTOP FOLLOW-UP VISIT: CPT

## 2025-07-31 ENCOUNTER — APPOINTMENT (OUTPATIENT)
Dept: ULTRASOUND IMAGING | Facility: IMAGING CENTER | Age: 75
End: 2025-07-31
Payer: MEDICARE

## 2025-07-31 ENCOUNTER — OUTPATIENT (OUTPATIENT)
Dept: OUTPATIENT SERVICES | Facility: HOSPITAL | Age: 75
LOS: 1 days | End: 2025-07-31
Payer: MEDICARE

## 2025-07-31 ENCOUNTER — RESULT REVIEW (OUTPATIENT)
Age: 75
End: 2025-07-31

## 2025-07-31 DIAGNOSIS — Z15.01 GENETIC SUSCEPTIBILITY TO MALIGNANT NEOPLASM OF BREAST: ICD-10-CM

## 2025-07-31 PROCEDURE — 76641 ULTRASOUND BREAST COMPLETE: CPT

## 2025-07-31 PROCEDURE — 76641 ULTRASOUND BREAST COMPLETE: CPT | Mod: 26,50,GA

## 2025-08-05 ENCOUNTER — NON-APPOINTMENT (OUTPATIENT)
Age: 75
End: 2025-08-05

## 2025-08-05 ENCOUNTER — APPOINTMENT (OUTPATIENT)
Age: 75
End: 2025-08-05
Payer: MEDICARE

## 2025-08-05 PROCEDURE — 92012 INTRM OPH EXAM EST PATIENT: CPT | Mod: 24

## 2025-08-08 ENCOUNTER — NON-APPOINTMENT (OUTPATIENT)
Age: 75
End: 2025-08-08

## 2025-08-08 ENCOUNTER — APPOINTMENT (OUTPATIENT)
Age: 75
End: 2025-08-08
Payer: MEDICARE

## 2025-08-08 PROCEDURE — 92134 CPTRZ OPH DX IMG PST SGM RTA: CPT

## 2025-08-08 PROCEDURE — 99024 POSTOP FOLLOW-UP VISIT: CPT

## 2025-08-13 ENCOUNTER — APPOINTMENT (OUTPATIENT)
Dept: RHEUMATOLOGY | Facility: CLINIC | Age: 75
End: 2025-08-13
Payer: MEDICARE

## 2025-08-13 VITALS
SYSTOLIC BLOOD PRESSURE: 131 MMHG | DIASTOLIC BLOOD PRESSURE: 74 MMHG | HEART RATE: 103 BPM | RESPIRATION RATE: 16 BRPM | BODY MASS INDEX: 25.8 KG/M2 | HEIGHT: 64.5 IN | WEIGHT: 153 LBS | OXYGEN SATURATION: 98 %

## 2025-08-13 DIAGNOSIS — H49.9 UNSPECIFIED PARALYTIC STRABISMUS: ICD-10-CM

## 2025-08-13 DIAGNOSIS — M47.812 SPONDYLOSIS W/OUT MYELOPATHY OR RADICULOPATHY, CERVICAL REGION: ICD-10-CM

## 2025-08-13 PROCEDURE — 99215 OFFICE O/P EST HI 40 MIN: CPT

## 2025-08-13 PROCEDURE — G2211 COMPLEX E/M VISIT ADD ON: CPT

## 2025-08-15 ENCOUNTER — NON-APPOINTMENT (OUTPATIENT)
Age: 75
End: 2025-08-15

## 2025-08-15 ENCOUNTER — APPOINTMENT (OUTPATIENT)
Dept: OPHTHALMOLOGY | Facility: CLINIC | Age: 75
End: 2025-08-15
Payer: MEDICARE

## 2025-08-15 PROCEDURE — 92012 INTRM OPH EXAM EST PATIENT: CPT | Mod: 24

## 2025-08-19 ENCOUNTER — NON-APPOINTMENT (OUTPATIENT)
Age: 75
End: 2025-08-19

## 2025-08-19 ENCOUNTER — APPOINTMENT (OUTPATIENT)
Age: 75
End: 2025-08-19
Payer: MEDICARE

## 2025-08-19 PROCEDURE — 99024 POSTOP FOLLOW-UP VISIT: CPT
